# Patient Record
Sex: MALE | Race: WHITE
[De-identification: names, ages, dates, MRNs, and addresses within clinical notes are randomized per-mention and may not be internally consistent; named-entity substitution may affect disease eponyms.]

---

## 2017-04-26 ENCOUNTER — HOSPITAL ENCOUNTER (OUTPATIENT)
Dept: HOSPITAL 80 - FIMAGING | Age: 82
End: 2017-04-26
Attending: INTERNAL MEDICINE
Payer: COMMERCIAL

## 2017-04-26 DIAGNOSIS — I71.4: Primary | ICD-10-CM

## 2017-04-26 DIAGNOSIS — Z72.0: ICD-10-CM

## 2017-04-26 DIAGNOSIS — I10: ICD-10-CM

## 2017-11-07 ENCOUNTER — HOSPITAL ENCOUNTER (OUTPATIENT)
Dept: HOSPITAL 80 - FIMAGING | Age: 82
End: 2017-11-07
Attending: INTERNAL MEDICINE
Payer: COMMERCIAL

## 2017-11-07 DIAGNOSIS — D47.2: Primary | ICD-10-CM

## 2017-11-07 DIAGNOSIS — S22.050A: ICD-10-CM

## 2017-12-06 ENCOUNTER — HOSPITAL ENCOUNTER (OUTPATIENT)
Dept: HOSPITAL 80 - BMCIMAGING | Age: 82
End: 2017-12-06
Attending: INTERNAL MEDICINE
Payer: COMMERCIAL

## 2017-12-06 DIAGNOSIS — I70.0: Primary | ICD-10-CM

## 2017-12-28 ENCOUNTER — HOSPITAL ENCOUNTER (EMERGENCY)
Dept: HOSPITAL 80 - FED | Age: 82
Discharge: HOME | End: 2017-12-28
Payer: COMMERCIAL

## 2017-12-28 VITALS — RESPIRATION RATE: 18 BRPM | OXYGEN SATURATION: 99 %

## 2017-12-28 VITALS — HEART RATE: 67 BPM | DIASTOLIC BLOOD PRESSURE: 64 MMHG | SYSTOLIC BLOOD PRESSURE: 141 MMHG | TEMPERATURE: 97.3 F

## 2017-12-28 VITALS
RESPIRATION RATE: 16 BRPM | HEART RATE: 68 BPM | SYSTOLIC BLOOD PRESSURE: 110 MMHG | OXYGEN SATURATION: 99 % | DIASTOLIC BLOOD PRESSURE: 49 MMHG

## 2017-12-28 DIAGNOSIS — R04.0: Primary | ICD-10-CM

## 2017-12-28 DIAGNOSIS — Z79.82: ICD-10-CM

## 2017-12-28 DIAGNOSIS — F17.200: ICD-10-CM

## 2017-12-28 DIAGNOSIS — Z95.5: ICD-10-CM

## 2017-12-28 PROCEDURE — 2Y41X5Z PACKING OF NASAL REGION USING PACKING MATERIAL: ICD-10-PCS | Performed by: EMERGENCY MEDICINE

## 2017-12-28 NOTE — EDPHY
H & P


Stated Complaint: Here this morning w/nosebleed;returns because it still hasn't 

stopped


Time Seen by Provider: 12/28/17 13:13


HPI/ROS: 





CHIEF COMPLAINT:  Epistaxis





HISTORY OF PRESENT ILLNESS:  Patient is an 82-year-old man with history of AFib 

on Pradaxa who I saw her earlier this morning with epistaxis.  His bleeding 

stopped with Afrin and a cotton ball soaked in cocaine.  He states that it is 

starting to ooze again around the cotton ball.  He does not think that any is 

running down his throat. He has not had a fever.  He has not felt lightheaded 

or dizzy.








REVIEW OF SYSTEMS:


Constitutional:  denies: chills, fever, recent illness, recent injury


EENTM:  See HPI


Respiratory: denies: cough, shortness of breath


Cardiac: denies: chest pain, irregular heart rate, lightheadedness, palpitations


Gastrointestinal/Abdominal: denies: abdominal pain, diarrhea, nausea, vomiting, 

blood streaked stools


Genitourinary: denies: dysuria, frequency, hematuria, pain


Musculoskeletal: denies: joint pain, muscle pain


Skin: denies: lesions, rash, jaundice, bruising


Neurological: denies: headache, numbness, paresthesia, tingling, dizziness, 

weakness


Hematologic/Lymphatic: denies: blood clots, easy bleeding, easy bruising


Immunologic/allergic: denies: HIV/AIDS, transplant








EXAM:


GENERAL:  Well-appearing, well-nourished and in no acute distress.


HEAD:  Atraumatic, normocephalic.


EYES:  Pupils equal round and reactive to light, extraocular movements intact, 

sclera anicteric, conjunctiva are normal.


ENT:  Right nares with clot behind cotton ball.  It easily removed.  Anterior 

bruising.


NECK:  Normal range of motion, supple without lymphadenopathy or JVD.


LUNGS:  Breath sounds clear to auscultation bilaterally and equal.  No wheezes 

rales or rhonchi.


HEART:  Regular rate and rhythm without murmurs, rubs or gallops.


ABDOMEN:  Soft, nontender, normoactive bowel sounds.  No guarding, no rebound.  

No masses appreciated.


BACK:  No CVA tenderness, no spinal tenderness, step-offs or deformities


EXTREMITIES:  Normal range of motion, no pitting or edema.  No clubbing or 

cyanosis.


NEUROLOGICAL:  Cranial nerves II through XII grossly intact.  Normal speech, 

normal gait.  5/5 strength, normal movement in all extremities, normal sensation


PSYCH:  Normal mood, normal affect.


SKIN:  Warm, dry, normal turgor, no visible rashes or lesions.








Source: Patient


Exam Limitations: No limitations





- Personal History


Current Tetanus Diphtheria and Acellular Pertussis (TDAP): Yes


Tetanus Vaccine Date: 2003





- Medical/Surgical History


Hx Asthma: No


Hx Chronic Respiratory Disease: No


Hx Diabetes: No


Hx Cardiac Disease: Yes


Hx Renal Disease: No


Hx Cirrhosis: No


Hx Alcoholism: No


Hx HIV/AIDS: No


Hx Splenectomy or Spleen Trauma: No


Other PMH: AFib, Stents x 5.





- Family History


Significant Family History: No pertinent family hx





- Social History


Smoking Status: Current every day smoker


Alcohol Use: Sober


Drug Use: None


Constitutional: 


 Initial Vital Signs











Heart Rate  66   12/28/17 12:57


 


Respiratory Rate  18   12/28/17 12:57


 


Blood Pressure  137/68 H  12/28/17 12:57


 


O2 Sat (%)  99   12/28/17 12:57








 











O2 Delivery Mode               Room Air














Allergies/Adverse Reactions: 


 





clopidogrel bisulfate [From Plavix] Allergy (Severe, Verified 12/28/17 12:56)


 MUSCLE WEAKNESS


atorvastatin [Atorvastatin] Allergy (Verified 12/28/17 12:56)


 MUSCLE PAIN








Home Medications: 














 Medication  Instructions  Recorded


 


Aspirin [Aspirin 325 mg] 325 mg PO DAILY 01/17/12


 


Metoprolol Tartrate [Lopressor] 100 mg PO BID 01/17/12


 


Rosuvastatin Calcium [Crestor] 5 mg PO DAILY 01/17/12


 


Finasteride  12/28/17


 


Losartan/Hctz 50/12.5  12/28/17


 


Pradaxa 150 MG (*)  12/28/17


 


Tamsulosin HCl  12/28/17














Medical Decision Making


Procedures: 





The patient was again instilled with Afrin bilateral clamp placed for 20 min.  

The bleeding practically stopped.  Cocaine was then given for anesthesia and a 

rhino rocket placed.  The patient tolerated this well.  The bleeding is 

completely controlled.


ED Course/Re-evaluation: 





Patient tolerated the procedure well. I did recommend he leave in place for 48 

hr and started antibiotics.  He is extremely hesitant to start antibiotics.  We 

agreed to leave the packing in place only for 24 hr and at that duration he 

will not need to start antibiotics.  He will take it out himself we discussed 

steps to take if starts bleeding including using Afrin and a clamp again and 

returning to the ER.


Differential Diagnosis: 





Partial list of the Differential diagnosis considered include but were not 

limited to;  medication reaction, trauma, infection, anterior epistaxis, 

posterior epistaxis and although unlikely based on the history and physical exam

, I also considered hemoptysis, hemorrhage.  I discussed these differential 

diagnoses and the plan with the patient as well as the usual and expected 

course.  The patient understands that the diagnosis is provisional and that in 

medicine we are not always correct and that further workup is often warranted.  

Usual and customary warnings were given.  All of the patient's questions were 

answered.  The patient was instructed to return to the emergency department 

should the symptoms at all worsen or return, otherwise to followup with the 

physician as we discussed.





Departure





- Departure


Disposition: Home, Routine, Self-Care


Clinical Impression: 


 Anterior epistaxis





Condition: Fair


Instructions:  Nosebleed (ED)


Referrals: 


Wali Dias MD [Primary Care Provider] - As per Instructions

## 2017-12-28 NOTE — EDPHY
H & P


Stated Complaint: nose bleed since 2am. On blood thinners


Time Seen by Provider: 12/28/17 07:49


HPI/ROS: 





CHIEF COMPLAINT:  Epistaxis





HISTORY OF PRESENT ILLNESS:  The patient is an 82-year-old man on Pradaxa for 

history of AFib and cardiac stents who comes to the emergency department 

complaining of epistaxis from his right nares that began while he was asleep 

last night.  He did not try any interventions at home.  He does not feel 

lightheaded or dizzy.  No trauma. No fever.  No recent infection.  No vomiting.








REVIEW OF SYSTEMS:


Constitutional:  denies: chills, fever, recent illness, recent injury


EENTM:  See HPI


Respiratory: denies: cough, shortness of breath


Cardiac: denies: chest pain, irregular heart rate, lightheadedness, palpitations


Gastrointestinal/Abdominal: denies: abdominal pain, diarrhea, nausea, vomiting, 

blood streaked stools


Genitourinary: denies: dysuria, frequency, hematuria, pain


Musculoskeletal: denies: joint pain, muscle pain


Skin: denies: lesions, rash, jaundice, bruising


Neurological: denies: headache, numbness, paresthesia, tingling, dizziness, 

weakness


Hematologic/Lymphatic: denies: blood clots, easy bleeding, easy bruising


Immunologic/allergic: denies: HIV/AIDS, transplant








EXAM:


GENERAL:  Well-appearing, well-nourished and in no acute distress.


HEAD:  Atraumatic, normocephalic.


EYES:  Pupils equal round and reactive to light, extraocular movements intact, 

sclera anicteric, conjunctiva are normal.


ENT:  Anterior epistaxis right nares, TMs normal, oropharynx clear without 

exudates.  Moist mucous membranes.


NECK:  Normal range of motion, supple without lymphadenopathy or JVD.


LUNGS:  Breath sounds clear to auscultation bilaterally and equal.  No wheezes 

rales or rhonchi.


HEART:  Regular rate and rhythm without murmurs, rubs or gallops.


ABDOMEN:  Soft, nontender, normoactive bowel sounds.  No guarding, no rebound.  

No masses appreciated.


BACK:  No CVA tenderness, no spinal tenderness, step-offs or deformities


EXTREMITIES:  Normal range of motion, no pitting or edema.  No clubbing or 

cyanosis.


NEUROLOGICAL:  Cranial nerves II through XII grossly intact.  Normal speech, 

normal gait.  5/5 strength, normal movement in all extremities, normal sensation


PSYCH:  Normal mood, normal affect.


SKIN:  Warm, dry, normal turgor, no visible rashes or lesions.








Source: Patient


Exam Limitations: No limitations





- Personal History


Current Tetanus Diphtheria and Acellular Pertussis (TDAP): Yes


Tetanus Vaccine Date: 2003





- Medical/Surgical History


Hx Asthma: No


Hx Chronic Respiratory Disease: No


Hx Diabetes: No


Hx Cardiac Disease: Yes


Hx Renal Disease: No


Hx Cirrhosis: No


Hx Alcoholism: No


Hx HIV/AIDS: No


Hx Splenectomy or Spleen Trauma: No


Other PMH: AFib, Stents x 5.





- Social History


Smoking Status: Heavy smoker


Alcohol Use: Sober


Drug Use: None


Constitutional: 


 Initial Vital Signs











Heart Rate  91   12/28/17 07:30


 


Respiratory Rate  18   12/28/17 07:30


 


Blood Pressure  114/92 H  12/28/17 07:30


 


O2 Sat (%)  95   12/28/17 07:30








 











O2 Delivery Mode               Room Air














Allergies/Adverse Reactions: 


 





clopidogrel bisulfate [From Plavix] Allergy (Severe, Verified 12/28/17 12:56)


 MUSCLE WEAKNESS


atorvastatin [Atorvastatin] Allergy (Verified 12/28/17 12:56)


 MUSCLE PAIN








Home Medications: 














 Medication  Instructions  Recorded


 


Aspirin [Aspirin 325 mg] 325 mg PO DAILY 01/17/12


 


Metoprolol Tartrate [Lopressor] 100 mg PO BID 01/17/12


 


Rosuvastatin Calcium [Crestor] 5 mg PO DAILY 01/17/12


 


Finasteride  12/28/17


 


Losartan/Hctz 50/12.5  12/28/17


 


Pradaxa 150 MG (*)  12/28/17


 


Tamsulosin HCl  12/28/17














Medical Decision Making


Procedures: 





The patient's nose was instilled with Afrin both sides and nasal clamp placed.  

It was then cleared with blowing and a cotton ball soaked in cocaine placed in 

the right anterior nares.  Bleeding controlled.


ED Course/Re-evaluation: 





9:15 a.m. the patient is feeling much better.  The bleeding has stopped with a 

cotton ball packing.  Will leave this in place for 24 hr.  He is ambulating 

without difficulty.  He is eager to leave.  We discussed return precautions.


Differential Diagnosis: 





Partial list of the Differential diagnosis considered include but were not 

limited to;  anterior nose bleed, steroid nosebleed, infection, trauma and 

although unlikely based on the history and physical exam, I also considered 

hemorrhage.  I discussed these differential diagnoses and the plan with the 

patient as well as the usual and expected course.  The patient understands that 

the diagnosis is provisional and that in medicine we are not always correct and 

that further workup is often warranted.  Usual and customary warnings were 

given.  All of the patient's questions were answered.  The patient was 

instructed to return to the emergency department should the symptoms at all 

worsen or return, otherwise to followup with the physician as we discussed.





- Data Points


Medications Given: 


 








Discontinued Medications





Cocaine HCl (Cocaine Hcl)  1 satish TP EDNOW ONE


   Stop: 12/28/17 07:58


   Last Admin: 12/28/17 07:58 Dose:  4 ml


Oxymetazoline HCl (Afrin Nasal Spray)  2 sprays EACHNARE EDNOW ONE


   Stop: 12/28/17 07:52


   Last Admin: 12/28/17 07:58 Dose:  2 spray


Silver Nitrate/Potassium Nitrate (Silver Nitrate Applicator)  1 each TP EDNOW 

ONE


   Stop: 12/28/17 07:58


   Last Admin: 12/28/17 07:59 Dose:  1 each








Departure





- Departure


Disposition: Home, Routine, Self-Care


Clinical Impression: 


 Anterior epistaxis





Condition: Fair


Instructions:  Nosebleed (ED)


Referrals: 


Wali Dias MD [Primary Care Provider] - As per Instructions

## 2018-06-12 ENCOUNTER — HOSPITAL ENCOUNTER (OUTPATIENT)
Dept: HOSPITAL 80 - FED | Age: 83
Setting detail: OBSERVATION
LOS: 1 days | Discharge: HOME | End: 2018-06-13
Attending: HOSPITALIST | Admitting: FAMILY MEDICINE
Payer: COMMERCIAL

## 2018-06-12 DIAGNOSIS — E03.9: ICD-10-CM

## 2018-06-12 DIAGNOSIS — N40.0: ICD-10-CM

## 2018-06-12 DIAGNOSIS — Z79.01: ICD-10-CM

## 2018-06-12 DIAGNOSIS — Z95.5: ICD-10-CM

## 2018-06-12 DIAGNOSIS — E78.5: ICD-10-CM

## 2018-06-12 DIAGNOSIS — E66.9: ICD-10-CM

## 2018-06-12 DIAGNOSIS — I25.10: ICD-10-CM

## 2018-06-12 DIAGNOSIS — F17.210: ICD-10-CM

## 2018-06-12 DIAGNOSIS — Z82.49: ICD-10-CM

## 2018-06-12 DIAGNOSIS — Z79.82: ICD-10-CM

## 2018-06-12 DIAGNOSIS — I48.91: ICD-10-CM

## 2018-06-12 DIAGNOSIS — Z66: ICD-10-CM

## 2018-06-12 DIAGNOSIS — I11.0: ICD-10-CM

## 2018-06-12 DIAGNOSIS — F10.20: ICD-10-CM

## 2018-06-12 DIAGNOSIS — I50.9: Primary | ICD-10-CM

## 2018-06-12 LAB
INR PPP: 1.57 (ref 0.83–1.16)
PROTHROMBIN TIME: 18.9 SEC (ref 12–15)

## 2018-06-12 PROCEDURE — 99285 EMERGENCY DEPT VISIT HI MDM: CPT

## 2018-06-12 PROCEDURE — 93005 ELECTROCARDIOGRAM TRACING: CPT

## 2018-06-12 PROCEDURE — 97161 PT EVAL LOW COMPLEX 20 MIN: CPT

## 2018-06-12 NOTE — ASMTCMCOM
CM Note

 

CM Note                       

Notes:

6/12/2018 Case Management Note



Discussed pt during rounds this morning.  Pt admitted for SOB with CHF exacerbation.  Pt recently 

completed a lengthy road trip to [place] with his wife Latrice [phone].



Pt lives independently with his wife in his own home.  [place] 6 [place] 

[place]  His primary MD is Dr. Dias.



Case Management d/c poc:  anticipating independent with follow up as directed.



Case Management available if needs change.

 

Date Signed:  06/12/2018 03:12 PM

Electronically Signed By:Sangeetha Romo RN

## 2018-06-12 NOTE — GPROG
[f rep st]



                                                                    PROGRESS NOTE





DATE OF SERVICE:  06/12/2018



SUBJECTIVE:  The patient feels better today.  He states he diuresed effectively overnight after recei
ving IV Lasix.  He denies orthopnea.  He is still slightly short of breath, but this is improved.  He
 has no chest pain.  He is afebrile.  He is eating well.



OBJECTIVE:  VITAL SIGNS:  Stable.  GENERAL:  Patient is awake, alert, oriented in no acute distress. 
 HEENT:  Head is atraumatic, normocephalic.  Pupils equal, round, and reactive to light.  Extraocular
 motions intact.  Oropharynx is clear.  Mucous membranes are moist.  NECK:  Supple.  He has 10-12 cm 
JVD.  HEART:  Regular rate and rhythm with 2/6 systolic ejection murmur.  LUNGS:  Reveal bibasilar cr
ackles with fairly good air exchange.  ABDOMEN:  Soft, nondistended, nontender.  Normoactive bowel so
unds.  EXTREMITIES:  He has trace to 1+ bilateral lower extremity edema.  Extremities, otherwise warm
, well perfused.  NEUROLOGIC:  Grossly nonfocal.



LABORATORY DATA:  INR is 1.57.  BNP 2200.



ASSESSMENT/PLAN:  The patient is an 83-year-old male patient who presented to the hospital with dyspn
ea, admitted for acute heart failure management.

1.  Acute heart failure.  His symptoms are improved after intravenous Lasix.  We will continue Lasix 
today orally and check an echocardiogram to evaluate his left ventricular function.

2.  Coronary artery disease, status post stent x15.  His EKG is nonischemic.  There is no evidence of
 acute coronary syndrome.  As above, an echo is pending.

3.  Atrial fibrillation.  The patient is rate controlled.  We will continue anticoagulation with Prad
axa.

4.  Benign prostatic hypertrophy.  Continue finasteride and Flomax.

5.  Valvular heart disease.

6.  Hypothyroidism.

7.  Tobacco abuse.

8.  Alcohol dependence.  He drinks up to 5 vodkas a day.  I offered to order him vodka while in the h
ospital, though he declines.  Will need to watch closely for signs of withdrawal, and prior to onset 
of withdrawal, initiate alcohol or CIWA protocol.



DISPOSITION:  Patient will require greater than 48 hours hospitalization for ongoing management of hi
s acute heart failure.





Job #:  [id number]

## 2018-06-13 VITALS — SYSTOLIC BLOOD PRESSURE: 94 MMHG | DIASTOLIC BLOOD PRESSURE: 48 MMHG

## 2018-06-13 RX ADMIN — METOPROLOL SUCCINATE SCH MG: 50 TABLET, EXTENDED RELEASE ORAL at 09:38

## 2018-06-13 RX ADMIN — FUROSEMIDE SCH MG: 20 TABLET ORAL at 09:38

## 2018-06-13 RX ADMIN — NICOTINE SCH: 14 PATCH TRANSDERMAL at 09:37

## 2018-06-13 RX ADMIN — DABIGATRAN ETEXILATE MESYLATE SCH MG: 150 CAPSULE ORAL at 08:39

## 2018-06-13 RX ADMIN — LEVOTHYROXINE SODIUM SCH: 50 TABLET ORAL at 06:49

## 2018-06-13 RX ADMIN — TAMSULOSIN HYDROCHLORIDE SCH MG: 0.4 CAPSULE ORAL at 09:38

## 2018-06-13 RX ADMIN — NICOTINE SCH: 14 PATCH TRANSDERMAL at 08:26

## 2018-06-13 RX ADMIN — METOPROLOL SUCCINATE SCH: 50 TABLET, EXTENDED RELEASE ORAL at 08:26

## 2018-06-13 RX ADMIN — FINASTERIDE SCH: 5 TABLET, FILM COATED ORAL at 08:26

## 2018-06-13 RX ADMIN — TAMSULOSIN HYDROCHLORIDE SCH: 0.4 CAPSULE ORAL at 08:26

## 2018-06-13 RX ADMIN — ASPIRIN SCH MG: 81 TABLET, DELAYED RELEASE ORAL at 09:37

## 2018-06-13 RX ADMIN — DABIGATRAN ETEXILATE MESYLATE SCH: 150 CAPSULE ORAL at 08:26

## 2018-06-13 RX ADMIN — FINASTERIDE SCH MG: 5 TABLET, FILM COATED ORAL at 09:37

## 2018-06-13 RX ADMIN — LEVOTHYROXINE SODIUM SCH: 50 TABLET ORAL at 08:25

## 2018-06-13 RX ADMIN — FUROSEMIDE SCH: 20 TABLET ORAL at 08:26

## 2018-06-13 RX ADMIN — ASPIRIN SCH: 81 TABLET, DELAYED RELEASE ORAL at 08:25

## 2018-06-13 NOTE — GHP
[f rep st]



                                                            HISTORY AND PHYSICAL





DATE OF ADMISSION:  2018



SOURCE:  Patient able to provide some of the history.  He and his wife are fair historians.  Specific
 details regarding patient's cardiac history are a little limited.  Currently, [employer] is down and I
 am unable to verify patient's past cardiac history.  Case was discussed with ED provider.



CHIEF COMPLAINT:  Shortness of breath and chest pressure.



HISTORY OF PRESENT ILLNESS:  This is a very pleasant 83-year-old gentleman with a past medical histor
y significant for coronary artery disease with history of stents, AFib, hypothyroidism, BPH, and valv
ular heart disease who presents to the emergency department early this morning with complaints of robbi
rtness of breath and chest pressure.  The patient does not wear home oxygen.  He reports that he has 
had an intermittent nonproductive cough.  No fevers, chills.  He has chronic lower extremity edema wh
ich appears to be close to chronic close to baseline, which is minimal currently.  The patient also r
eports slight orthopnea but no PND.  He has had recent extended travel while driving to [place] and [employer].  He denies any calf pain or swelling.  No recent sick contacts.



REVIEW OF SYSTEMS:  GENERAL:  Patient reports that he always feels cold but has not had any fevers or
 chills otherwise. SKIN:  No rashes or sores.  ENT:  Patient with chronic nasal congestion but no sor
e throat. EYES:  No acute changes in vision or ocular pain.  CV:  Patient reports a central chest pre
ssure associated with his shortness of breath.  He also reports history of atrial fibrillation with i
ntermittent palpitations, but nothing currently.  RESPIRATORY:  See HPI.  GI:  No nausea, vomiting, a
bdominal pain or diarrhea.  :  No dysuria or hematuria.  Patient does have a history of nocturia an
d BPH.  MUSCULOSKELETAL:  Patient reports progressive upper extremity weakness that has been ongoing 
for the last 5 to 10 years.  He has no focal weakness complaint.  No joint pains from baseline.  NEUR
O:  Patient denies headache recently.  He has chronic neuropathy in both feet.  PSYCH:  Negative for 
anxiety, depression.  Remainder of review of systems negative except as noted above.



ALLERGIES:  No known drug allergies.



HOME MEDICATIONS:  List reviewed with the patient's wife.  Metoprolol succinate 100 mg half tab in th
e evening; rosuvastatin 20 mg half tab in the evening; levothyroxine 50 mcg daily; finasteride 5 mg, 
patient now taking a half tab at night; aspirin 81 mg in the morning; losartan 12.5 mg daily in the e
vening; Pradaxa 300 mg p.o. daily; tamsulosin 0.4 mg 2 tabs daily.



PAST MEDICAL HISTORY:  Significant for CAD with history of stenting.  The patient reports he has had 
a total of 5 stents placed very the course of 2 caths.  A history of atrial fibrillation on chronic a
nticoagulation with Pradaxa, hypothyroidism, BPH, valvular heart disease.  The patient is unsure if h
mae has any underlying CHF, but he does report approximately 20 to 30 years ago patient had a pericardi
al effusions.



PAST SURGICAL HISTORY:  Significant for cardiac cath x2 with 5 stents total, tonsillectomy, adenoidec
caro and left cataract extraction with lens placement.



FAMILY HISTORY:  Father , age 42, from [place].  CAD runs in the family.  Both his brothers had by
pass at younger age and a sister with history of diabetes type 2.



SOCIAL HISTORY:  Patient is , lives with his wife.  Son is in town and supportive.  He does co
ntinue to smoke approximately quarter pack to half pack per day.  Denies any illicit drug use or joana
olive use.  The patient does drink greater than 4 to 5 ounces, but he cannot exactly quantify his tot
al alcohol intake as he does utilize in his nonalcoholic beverages adding it several times daily.  De
christianoes any history of withdrawal.



CODE STATUS:  DNR/DNI with advanced directives in place.  Confirmed with wife at bedside.



PHYSICAL EXAM:  VITALS SIGNS:  Blood pressure initially 144/64, heart rate 77, respiratory rate 18, t
emperature 36.8, pulse ox 93% on room air.  Repeated blood pressure recently on exam is 110/63, heart
 rate 75, respiratory rate 16, pulse ox 96% on 2 L by nasal cannula.  GENERAL:  No acute distress.  V
beatris pleasant, frail, elderly gentleman is lying quietly in bed.  His wife is at bedside as well as hi
s son.  HEAD:  Normocephalic, atraumatic.  EYES:  Extraocular muscles are intact.  Pupils equal, roun
d, with decreased reactivity to light bilaterally and symmetric.  No scleral icterus or conjunctival 
injection.  ENT:  Mucous membranes appear moist.  No oropharyngeal erythema.  Dentures in place.  No 
nasal discharge. NECK:  Supple, trachea midline.  CV:  Quite distant heart sounds with fairly regular
 rhythm.  Patient has a holosystolic murmur, greatest at the left sternal border.  No chest wall tend
erness to palpation.  RESPIRATORY:  Diminished at the bases.  No wheezes or rhonchi appreciated.  Unl
abored breathing.  ABDOMEN:  Slightly distended.  Patient complains of bladder discomfort with palpat
ion over the lower abdomen, but no rebound, guarding, or masses are appreciated.  Positive bowel soun
ds.  :  No suprapubic tenderness to palpation.  Patient does report some bladder discomfort due to 
feeling like he needs to void.  No Nieves catheter in place.  EXTREMITIES:  Trace lower extremity gaby
a.  Patient with 1+ pedal pulses bilaterally and symmetric.  NEURO:  Grossly nonfocal, no facial droo
ping.  Generalized weakness.  Moves all extremities, upper and lower, while lying in bed.  PSYCH:  Th
ought process, content and questions are appropriate.  Patient is awake, alert, and oriented x3.  



EKG reviewed myself showing atrial fibrillation in the 70s with occasional PVC.  Patient with Q-waves
 in the inferior and anteroseptal leads.  QTc is 453.



LABORATORY STUDIES:  WBC 6.56, H and H 17.2 and 50.5, MCV 83.9, platelet count 143.  Patient with a n
eutrophilia of 92.7%, 0.8% bands.  PT 18.9, INR 1.57, PTT 47.6,  D-dimer 0.27.  Sodium is 134, potass
ium 4.4, chloride 101, CO2 22, BUN 12, creatinine 0.9, glucose 110, calcium 8.7. 



Chest x-ray:  Image reviewed myself.  Report is still pending.  Reviewed and discussed with ED provid
er showing cardiomegaly with some pulmonary vascular congestion.  No effusions appreciated.  No conso
lidations.



ASSESSMENT AND PLAN:  This is a very pleasant 83-year-old gentleman with a history of coronary artery
 disease, atrial fibrillation on anticoagulation and BPH who presents to the emergency department wit
h progressive shortness of breath and some central chest pressure.

1.  Dyspnea.  Differential diagnosis including chronic obstructive pulmonary disease exacerbation sagar
chacha congestive heart failure decompensation.  Patient reports having a recent echocardiogram complete
d on outpatient basis.  This was done with [facility] and we will try to obtain patient's recent records, bu
t may need to consider repeating echocardiogram during stay.  Patient received nebulizer treatment as
 well as a dose of Lasix after which he has been diuresing appropriately.  He reports his shortness o
f breath has improved.  We will continue with nebulizers, but hold off on steroid use and will hold o
ff on additional dosing of Lasix as patient has had multiple voids and continues to urinate well.  He
 does have a history of BPH and he is concerned regarding any obstruction.

2.  Chest pressure, possibly related to chronic obstructive pulmonary disease and hypoxia versus apurva
nary artery disease and angina.  The patient's troponin is negative.  His D-dimer was also found to b
e negative in setting of recent travel.  The patient without any calf pain or increased swelling.  We
 will hold off on additional evaluation given the specificity of the D-dimer.  The patient's BTMP was
 elevated at 2200.

3.  Chronic medical conditions:

a.  Atrial fibrillation, currently rate controlled.  Continue with anticoagulation.  

b.  Hypothyroidism.  Resume patient's levothyroxine replacement. 

c.  BPH.  Continue patient's finasteride and tamsulosin.  Family plans to bring in his home medicatio
ns as he will initially be under observation status.  

d.  History of valvular heart disease.  Monitor the patient's condition closely with ongoing diuresis
.

4.  Fluid, electrolytes, nutrition:  Saline lock IV.  Electrolyte monitoring and replacement if neede
d.  Cardiac diet has been ordered.

5.  Fluid restriction in addition to 1500 mL per day at this time.  Salt restriction as well.

6.  Code status is DNR/DNI.



DISPOSITION:  Patient admitted to observation status initially on PCU pending his clinical progressio
n with diuresis and nebulizer treatments for his dyspnea.  We will also plan to monitor troponin and 
trend given patient's complaint of chest pressure.  Of note, he and his wife do note that this pain d
oes feel quite different from his previous experiences during his MIs of left shoulder pain with dysp
efrain.





Job #:  [id number]

## 2018-06-13 NOTE — CPEKG
Heart Rate: 77

RR Interval: 779

QRSD Interval: 82

QT Interval: 400

QTC Interval: 453

QRS Axis: -24

T Wave Axis: 29

EKG Severity - ABNORMAL ECG -

EKG Impression: ATRIAL FIBRILLATION

EKG Impression: VENTRICULAR PREMATURE COMPLEX

EKG Impression: PROBABLE INFERIOR INFARCT, AGE INDETERMINATE

EKG Impression: CONSIDER ANTEROSEPTAL INFARCT

EKG Impression: ATRIAL FIBRILLATION HAS REPLACED NORMAL SINUS RHYTHM NOTED ON PRIOR ECG (FROM

EKG Impression: 25-JUL-2011)

Electronically Signed By: Flakito Urrutia 13-Jun-2018 11:30:00

## 2018-06-13 NOTE — EDPHY
[facility]  

  

Patient Name:  LETICIA MONTANEZ Rpt#:  [id number]  

Account Number:  [id number] Unit Number:  [id number]  

ER Physician:  Nabor Ayers MD Patient Type:  ADM Cheryl  

Adm Date/Source:  06/12/18 EMR Discharge Date:    

Primary Carrier:  MEDICARE OUTPATIENT  

___________________________________________________________________________________  

                  EMERGENCY DEPARTMENT PROVIDER REPORT  

  

  

H   P  

Stated Complaint: chest pain  

Time Seen by Provider: 06/12/18 01:50  

HPI/ROS:   

  

HPI  

  

CHIEF COMPLAINT:  Chest pain, shortness of breath  

HISTORY OF PRESENT ILLNESS:  Patient is a very pleasant 83-year-old male, presents emergency room 

with chest discomfort and shortness of breath.  He does report to me that he has coronary artery 

disease with 5 stents.  Additionally he takes Pradaxa as he history of AFib.  He does have a long 

history of smoking tobacco and smokes half a pack per day.  He recently went on a trip to [place] 

drove there and back.  For the past 2 days he has gotten progressively worsening shortness of 

breath.  Also some chest pressure.  Decided come the emergency room as he was unable to sleep.  He 

states he can't take a deep breath in.  

  

Past Medical History:  Coronary artery disease with multiple stents, AFib, hypertension, 

hyperlipidemia  

  

Past Surgical History cardiac stents  

  

Social History:  Denies drugs alcohol tobacco.  

  

Family History:  Noncontributory  

  

  

ROS     

REVIEW OF SYSTEMS:  

A comprehensive 10 point review of systems is otherwise negative aside from elements mentioned in 

the history of present illness.  

  

  

Exam     

Constitutional   triage nursing summary reviewed, vital signs reviewed, awake/alert.   

Eyes   normal conjunctivae and sclera, EOMI, PERRLA.   

HENT   normal inspection, atraumatic, moist mucus membranes, no epistaxis, neck supple/ no 

meningismus, no raccoon eyes.   

Respiratory   decreased breath sounds bilaterally, no respiratory distress, no wheezing.   

Cardiovascular   rate normal, regular rhythm, no murmur, no edema, distal pulses normal.   

Gastrointestinal   soft, non-tender, no rebound, no guarding, normal bowel sounds, no distension, 

no pulsatile mass.   

Genitourinary   no CVA tenderness.   

Musculoskeletal  no midline vertebral tenderness, full range of motion, no calf swelling, no 

tenderness of extremities, no meningismus, good pulses, neurovascularly intact.  

Skin   pink, warm,   dry, no rash, skin atraumatic.   

Neurologic   awake, alert and oriented x 3, AAOx3, moves all 4 extremities equally, motor intact, 

sensory intact, CN II-XII intact, normal cerebellar, normal vision, normal speech.   

Psychiatric   normal mood/affect.   

Heme/Lymph/Immune   no lymphadenopathy.  

  

Differential diagnosis includes but is not limited to:  ACS, atypical chest pain, pneumothorax, 

pneumonia, pulmonary embolism, aortic dissection, congestive heart failure, tumor, musculoskeletal 

pain, esophageal pain, GERD, peptic ulcer disease, pancreatitis  

  

Medical Decision Making:  Plan for this patient IV establishment full cardiac monitor obtain EKG to 

rule out acute coronary syndrome, chest x-ray, check D-dimer, check a BNP, and re-evaluate.  

  

Re-evaluation:  

EKG interpretation by me on record in [employer] system.  Impression time of EKG 2:03 a.m., this 

is AFib rate of 77 PVC present.  No acute ischemic change.  

Chest x-ray reviewed by myself.  Two view.  Cardiomegaly with pulmonary edema.  

  

0330:  I have ordered this patient 40 mg IV Lasix.  Is also noted his BNP is elevated at 2290  

  

Patient's troponin noted to be negative.  0.02.  Additionally patient's D-dimer-0.27.  

  

Patient has elevated BNP, shortness of breath, decreased breath sounds bilaterally, cardiomegaly on 

the chest x-ray with some pulmonary edema  

  

I have ordered this patient IV Lasix 40 mg. EKG shows AFib with no acute ischemia.  

  

Given this patient's cardiac risk factors including obesity, hyperlipidemia, hypertension, coronary 

disease with stents patient need to be admitted the hospital.  

  

Final diagnosis shortness of breath, congestive heart failure.  

  

Spoke with the hospitalist service 400AM; Dr. Myers agrees to admit.   

  

Source: Patient  

  

- Personal History  

Current Tetanus/Diphtheria Vaccine: Yes  

Current Tetanus Diphtheria and Acellular Pertussis (TDAP): Yes  

Tetanus Vaccine Date: 2003  

  

- Medical/Surgical History  

Hx Asthma: No  

Hx Chronic Respiratory Disease: No  

Hx Diabetes: No  

Hx Cardiac Disease: Yes  

Hx Renal Disease: No  

Hx Cirrhosis: No  

Hx Alcoholism: No  

Hx HIV/AIDS: No  

Hx Splenectomy or Spleen Trauma: No  

Other PMH: AFib, Stents x 5.  

  

- Social History  

Smoking Status: Current every day smoker  

Constitutional:   

  

 Initial Vital Signs  

  

  

  

Temperature (C)  36.8 C   06/12/18 01:50  

   

Heart Rate  77   06/12/18 01:50  

   

Respiratory Rate  18   06/12/18 01:50  

   

Blood Pressure  114/64   06/12/18 01:50  

   

O2 Sat (%)  93   06/12/18 01:50  

  

  

   

  

  

  

O2 Delivery Mode               Room Air  

  

  

  

  

Allergies/Adverse Reactions:   

   

  

clopidogrel bisulfate [From Plavix] Allergy (Severe, Verified 12/28/17 12:56)  

 MUSCLE WEAKNESS  

atorvastatin [Atorvastatin] Allergy (Verified 12/28/17 12:56)  

 MUSCLE PAIN  

  

  

Home Medications:   

  

  

  

  

 Medication  Instructions  Recorded  

   

Aspirin [Aspirin 325 mg] 325 mg PO DAILY 01/17/12  

   

Metoprolol Tartrate [Lopressor] 100 mg PO BID 01/17/12  

   

Rosuvastatin Calcium [Crestor] 5 mg PO DAILY 01/17/12  

   

Finasteride  12/28/17  

   

Losartan/Hctz 50/12.5  12/28/17  

   

Pradaxa 150 MG (*)  12/28/17  

   

Tamsulosin HCl  12/28/17  

  

  

  

  

Medical Decision Making  

  

- Data Points  

Laboratory Results:   

  

 Laboratory Results  

  

 06/12/18 02:11   

  

 06/12/18 02:11   

  

   

  

  

  

  06/12/18 06/12/18 06/12/18  

  

  02:11 02:11 02:11  

   

WBC      5.20 10 3/uL 10 3/uL  

  

     (3.80-9.50)   

   

RBC      3.20 10 6/uL L 10 6/uL  

  

     (4.40-6.38)   

   

Hgb      10.9 g/dL L g/dL  

  

     (13.7-17.5)   

   

Hct      31.6 % L %  

  

     (40.0-51.0)   

   

MCV      98.8 fL fL  

  

     (81.5-99.8)   

   

MCH      34.1 pg pg  

  

     (27.9-34.1)   

   

MCHC      34.5 g/dL g/dL  

  

     (32.4-36.7)   

   

RDW      15.0 % %  

  

     (11.5-15.2)   

   

Plt Count      127 10 3/uL L 10 3/uL  

  

     (150-400)   

   

MPV      11.2 fL fL  

  

     (8.7-11.7)   

   

Neut % (Auto)      53.8 % %  

  

     (39.3-74.2)   

   

Lymph % (Auto)      29.0 % %  

  

     (15.0-45.0)   

   

Mono % (Auto)      12.5 % %  

  

     (4.5-13.0)   

   

Eos % (Auto)      3.5 % %  

  

     (0.6-7.6)   

   

Baso % (Auto)      0.4 % %  

  

     (0.3-1.7)   

   

Nucleat RBC Rel Count      0.0 % %  

  

     (0.0-0.2)   

   

Absolute Neuts (auto)      2.80 10 3/uL 10 3/uL  

  

     (1.70-6.50)   

   

Absolute Lymphs (auto)      1.51 10 3/uL 10 3/uL  

  

     (1.00-3.00)   

   

Absolute Monos (auto)      0.65 10 3/uL 10 3/uL  

  

     (0.30-0.80)   

   

Absolute Eos (auto)      0.18 10 3/uL 10 3/uL  

  

     (0.03-0.40)   

   

Absolute Basos (auto)      0.02 10 3/uL 10 3/uL  

  

     (0.02-0.10)   

   

Absolute Nucleated RBC      0.00 10 3/uL 10 3/uL  

  

     (0-0.01)   

   

Immature Gran %      0.8 % %  

  

     (0.0-1.1)   

   

Immature Gran #      0.04 10 3/uL 10 3/uL  

  

     (0.00-0.10)   

   

PT    18.9 SEC H SEC    

  

    (12.0-15.0)    

   

INR    1.57  H     

  

    (0.83-1.16)    

   

APTT    47.6 SEC H SEC    

  

    (23.0-38.0)    

   

D-Dimer    < 0.27 ug/mLFEU ug/mLFEU    

  

    (0.00-0.50)    

   

Sodium  134 mEq/L L mEq/L      

  

   (135-145)     

   

Potassium  4.4 mEq/L mEq/L      

  

   (3.3-5.0)     

   

Chloride  101 mEq/L mEq/L      

  

   ()     

   

Carbon Dioxide  22 mEq/l mEq/l      

  

   (22-31)     

   

Anion Gap  11 mEq/L mEq/L      

  

   (8-16)     

   

BUN  12 mg/dL mg/dL      

  

   (7-23)     

   

Creatinine  0.9 mg/dL mg/dL      

  

   (0.7-1.3)     

   

Estimated GFR  > 60       

  

      

   

Glucose  110 mg/dL H mg/dL      

  

   ()     

   

Calcium  8.7 mg/dL mg/dL      

  

   (8.5-10.4)     

   

NT-Pro-B Natriuret Pep  2290 pg/mL H pg/mL      

  

   (0-450)     

  

  

  

  

Departure  

  

- Departure  

Disposition: [facility] Inpatient Acute  

Clinical Impression:   

 Acute dyspnea  

  

Chest pain  

Qualifiers:  

 Chest pain type: unspecified Qualified Code(s): R07.9 - Chest pain, unspecified  

  

Condition: Fair  

  

  

*This report may have been compiled using a voice recognition system, and might contain 

typographical errors and blanks.*  

  

Nabor Ayers MD  

  

  

  

  

  

  

  

06/12/18 0753 <Electronically signed by Nabor Ayers MD>  

   

   

   

  

D: 06/12/18 0752  T: AARON  06/12/18 0752  

CC: Wali Dias MD

## 2018-06-13 NOTE — EDPHY
H & P


Stated Complaint: recent air travel, today having cp/sob/intermittent dizziness


Time Seen by Provider: 06/12/18 03:00


HPI/ROS: 





HPI





CHIEF COMPLAINT:  Chest pain, shortness of breath


HISTORY OF PRESENT ILLNESS:  Patient is a very pleasant 83-year-old male, 

presents emergency room with chest discomfort and shortness of breath.  He does 

report to me that he has coronary artery disease with 5 stents.  Additionally 

he takes Pradaxa as he history of AFib.  He does have a long history of smoking 

tobacco and smokes half a pack per day.  He recently went on a trip to [place] 

drove there and back.  For the past 2 days he has gotten progressively 

worsening shortness of breath.  Also some chest pressure.  Decided come the 

emergency room as he was unable to sleep.  He states he can't take a deep 

breath in.





Past Medical History:  Coronary artery disease with multiple stents, AFib, 

hypertension, hyperlipidemia





Past Surgical History cardiac stents





Social History:  Denies drugs alcohol tobacco.





Family History:  Noncontributory








ROS   


REVIEW OF SYSTEMS:


A comprehensive 10 point review of systems is otherwise negative aside from 

elements mentioned in the history of present illness.








Exam   


Constitutional   triage nursing summary reviewed, vital signs reviewed, awake/

alert. 


Eyes   normal conjunctivae and sclera, EOMI, PERRLA. 


HENT   normal inspection, atraumatic, moist mucus membranes, no epistaxis, neck 

supple/ no meningismus, no raccoon eyes. 


Respiratory   decreased breath sounds bilaterally, no respiratory distress, no 

wheezing. 


Cardiovascular   rate normal, regular rhythm, no murmur, no edema, distal 

pulses normal. 


Gastrointestinal   soft, non-tender, no rebound, no guarding, normal bowel 

sounds, no distension, no pulsatile mass. 


Genitourinary   no CVA tenderness. 


Musculoskeletal  no midline vertebral tenderness, full range of motion, no calf 

swelling, no tenderness of extremities, no meningismus, good pulses, 

neurovascularly intact.


Skin   pink, warm, & dry, no rash, skin atraumatic. 


Neurologic   awake, alert and oriented x 3, AAOx3, moves all 4 extremities 

equally, motor intact, sensory intact, CN II-XII intact, normal cerebellar, 

normal vision, normal speech. 


Psychiatric   normal mood/affect. 


Heme/Lymph/Immune   no lymphadenopathy.





Differential diagnosis includes but is not limited to:  ACS, atypical chest pain

, pneumothorax, pneumonia, pulmonary embolism, aortic dissection, congestive 

heart failure, tumor, musculoskeletal pain, esophageal pain, GERD, peptic ulcer 

disease, pancreatitis





Medical Decision Making:  Plan for this patient IV establishment full cardiac 

monitor obtain EKG to rule out acute coronary syndrome, chest x-ray, check D-

dimer, check a BNP, and re-evaluate.





Re-evaluation:


EKG interpretation by me on record in [employer] system.  Impression time of 

EKG 2:03 a.m., this is AFib rate of 77 PVC present.  No acute ischemic change.


Chest x-ray reviewed by myself.  Two view.  Cardiomegaly with pulmonary edema.





0330:  I have ordered this patient 40 mg IV Lasix.  Is also noted his BNP is 

elevated at 2290





Patient's troponin noted to be negative.  0.02.  Additionally patient's D-dimer-

0.27.





Patient has elevated BNP, shortness of breath, decreased breath sounds 

bilaterally, cardiomegaly on the chest x-ray with some pulmonary edema





I have ordered this patient IV Lasix 40 mg. EKG shows AFib with no acute 

ischemia.





Given this patient's cardiac risk factors including obesity, hyperlipidemia, 

hypertension, coronary disease with stents patient need to be admitted the 

hospital.





Final diagnosis shortness of breath, congestive heart failure.





Spoke with the hospitalist service 400AM; Dr. Myers agrees to admit. 





Source: Patient





- Personal History


Tetanus Vaccine Date: 2003





- Medical/Surgical History


Hx Asthma: No


Hx Chronic Respiratory Disease: No


Hx Diabetes: No


Hx Cardiac Disease: Yes


Hx Renal Disease: No


Hx Cirrhosis: No


Hx Alcoholism: No


Hx HIV/AIDS: No


Hx Splenectomy or Spleen Trauma: No


Other PMH: AFib, Stents x 5, pericarditis, mi, bph, hyperlipidemia, hypertension

, hypothyroid





- Social History


Smoking Status: Current every day smoker


Constitutional: 





 Initial Vital Signs











Temperature (C)  36.8 C   06/12/18 01:52


 


Heart Rate  77   06/12/18 01:52


 


Respiratory Rate  18   06/12/18 01:52


 


Blood Pressure  114/64   06/12/18 01:52


 


O2 Sat (%)  93   06/12/18 01:52








 











O2 Delivery Mode               Room Air














Allergies/Adverse Reactions: 


 





clopidogrel bisulfate [From Plavix] Allergy (Severe, Verified 12/28/17 12:56)


 MUSCLE WEAKNESS


atorvastatin [Atorvastatin] Allergy (Verified 12/28/17 12:56)


 MUSCLE PAIN








Home Medications: 














 Medication  Instructions  Recorded


 


Aspirin [Aspirin 325 mg] 325 mg PO DAILY 01/17/12


 


Metoprolol Tartrate [Lopressor] 100 mg PO BID 01/17/12


 


Rosuvastatin Calcium [Crestor] 5 mg PO DAILY 01/17/12


 


Finasteride  12/28/17


 


Losartan/Hctz 50/12.5  12/28/17


 


Pradaxa 150 MG (*)  12/28/17


 


Tamsulosin HCl  12/28/17














Medical Decision Making





- Data Points


Laboratory Results: 





 Laboratory Results





 06/12/18 02:08 





 











  06/12/18 06/12/18





  02:11 02:08


 


PT  18.9 SEC H SEC  





   (12.0-15.0)  


 


INR  1.57  H   





   (0.83-1.16)  


 


APTT  47.6 SEC H SEC  





   (23.0-38.0)  


 


D-Dimer  < 0.27 ug/mLFEU ug/mLFEU  





   (0.00-0.50)  


 


Sodium    134 mEq/L L mEq/L





    (135-145) 


 


Potassium    4.4 mEq/L mEq/L





    (3.3-5.0) 


 


Chloride    101 mEq/L mEq/L





    () 


 


Carbon Dioxide    22 mEq/l mEq/l





    (22-31) 


 


Anion Gap    11 mEq/L mEq/L





    (8-16) 


 


BUN    12 mg/dL mg/dL





    (7-23) 


 


Creatinine    0.9 mg/dL mg/dL





    (0.7-1.3) 


 


Estimated GFR    > 60 





   


 


Glucose    110 mg/dL H mg/dL





    () 


 


Calcium    8.7 mg/dL mg/dL





    (8.5-10.4) 


 


NT-Pro-B Natriuret Pep    2290 pg/mL H pg/mL





    (0-450) 














Departure





- Departure


Disposition: [facility] Inpatient Acute


Clinical Impression: 


Chest pain


Qualifiers:


 Chest pain type: unspecified Qualified Code(s): R07.9 - Chest pain, unspecified





Condition: Fair

## 2018-06-14 NOTE — PDMN
Medical Necessity


Medical necessity: est los>2mn for management of heart failure with transition 

to oral Lasix; comorbid CAD, afib , valvular heart disease, and etoh dependence

;  per order and progress note 6/12/18

## 2018-06-19 ENCOUNTER — HOSPITAL ENCOUNTER (INPATIENT)
Dept: HOSPITAL 80 - FED | Age: 83
LOS: 4 days | Discharge: HOME | DRG: 378 | End: 2018-06-23
Attending: INTERNAL MEDICINE | Admitting: INTERNAL MEDICINE
Payer: COMMERCIAL

## 2018-06-19 DIAGNOSIS — Z95.5: ICD-10-CM

## 2018-06-19 DIAGNOSIS — I95.9: ICD-10-CM

## 2018-06-19 DIAGNOSIS — J44.9: ICD-10-CM

## 2018-06-19 DIAGNOSIS — I11.0: ICD-10-CM

## 2018-06-19 DIAGNOSIS — F17.210: ICD-10-CM

## 2018-06-19 DIAGNOSIS — I48.2: ICD-10-CM

## 2018-06-19 DIAGNOSIS — E78.5: ICD-10-CM

## 2018-06-19 DIAGNOSIS — N40.0: ICD-10-CM

## 2018-06-19 DIAGNOSIS — I25.10: ICD-10-CM

## 2018-06-19 DIAGNOSIS — K25.4: Primary | ICD-10-CM

## 2018-06-19 DIAGNOSIS — I50.9: ICD-10-CM

## 2018-06-19 DIAGNOSIS — D62: ICD-10-CM

## 2018-06-19 DIAGNOSIS — Z79.01: ICD-10-CM

## 2018-06-19 DIAGNOSIS — E03.9: ICD-10-CM

## 2018-06-19 LAB
INR PPP: 1.89 (ref 0.83–1.16)
PLATELET # BLD: 179 10^3/UL (ref 150–400)
PROTHROMBIN TIME: 21.8 SEC (ref 12–15)

## 2018-06-19 PROCEDURE — P9021 RED BLOOD CELLS UNIT: HCPCS

## 2018-06-19 PROCEDURE — P9016 RBC LEUKOCYTES REDUCED: HCPCS

## 2018-06-19 RX ADMIN — ROSUVASTATIN CALCIUM SCH: 20 TABLET, FILM COATED ORAL at 20:28

## 2018-06-19 RX ADMIN — PANTOPRAZOLE SODIUM SCH MG: 40 INJECTION, POWDER, FOR SOLUTION INTRAVENOUS at 20:36

## 2018-06-19 RX ADMIN — THERA TABS SCH: TAB at 20:27

## 2018-06-19 NOTE — PDGENHP
History and Physical





- Chief Complaint


dizzyness





- History of Present Illness


82 yo M with PMH of CAD, a fib on chronic AC presenting with several days of 

lightheadedness/dizzyness in the setting of dark/tarry stools and more recently 

several episodes of vomiting dark/red vomit he suspected was blood due to the 

appearance and metallic taste. He notes that he initially assumed his stools 

were dark because he was taking iron. He became increasingly dizzy and weak to 

the point where he actually fell and felt confused. He has never had similar 

issues in the past. He denies abdominal pain. He has not had bright red blood 

in his stool. He denies chest pain or sob. He did not take his pradaxa today 

but he did take all of his BP medications. 





History Information





- Allergies/Home Medication List


Allergies/Adverse Reactions: 








clopidogrel bisulfate [From Plavix] Allergy (Severe, Verified 06/19/18 09:23)


 MUSCLE WEAKNESS


atorvastatin [Atorvastatin] Allergy (Verified 06/19/18 09:23)


 MUSCLE PAIN





Home Medications: 








Dabigatran Etexilate Mesyl [Pradaxa 150 MG (*)] 150 mg PO BID 12/28/17 [Last 

Taken 06/18/18 21:00]


Finasteride [Proscar 5 MG (*)] 2.5 mg PO DAILY 12/28/17 [Last Taken 06/19/18]


Losartan Potassium [Cozaar 25 mg (*)] 12.5 mg PO HS 12/28/17 [Last Taken 06/18/ 18]


Tamsulosin HCl [Flomax 0.4 MG (*)] 0.8 mg PO DAILY 12/28/17 [Last Taken 06/18/18

]


Aspirin [Aspirin 81mg (*)] 81 mg PO HS 06/13/18 [Last Taken 06/18/18]


Levothyroxine [Synthroid 75 mcg (*)] 75 mcg PO DAILY06 06/13/18 [Last Taken 06/ 19/18]


Metoprolol Succinate Xr [Toprol Xl 50 mg (*)] 50 mg PO BID 06/13/18 [Last Taken 

06/19/18]


Rosuvastatin Calcium [Crestor 20mg (*)] 10 mg PO HS 06/13/18 [Last Taken 06/18/ 18]


Herbals/Supplements -Info Only 1 ea PO DAILY 06/19/18 [Last Taken Unknown]


Multivitamins [Multivitamin (*)] 0.5 each PO BID 06/19/18 [Last Taken 06/18/18 

21:00]





I have personally reviewed and updated: family history, medical history, social 

history, surgical history





- Past Medical History


atrial fibrillation, coronary artery disease (s/p multiple stents), COPD, 

hypertension, hyperlipidemia


Additional medical history: hypothyroid.  BPH





- Surgical History


Reports: coronary stent (x 5)


Additional surgical history: tonsills





- Family History


Positive for: male first degree with history of premature CAD





- Social History


Smoking Status: Current every day smoker


Alcohol Use: Heavy (drinks 3-4 drinks/day, 6-8 oz heavy etoh)


Drug Use: None


Additional social history: , accompanied by his son and wife





Review of Systems


Review of Systems: 





ROS: 10pt was reviewed & negative except for what was stated in HPI & below





Physical Exam


Physical Exam: 

















Temp Pulse Resp BP Pulse Ox


 


 36.4 C   69   18   98/54 L  99 


 


 06/19/18 11:14  06/19/18 12:46  06/19/18 12:46  06/19/18 12:46  06/19/18 12:46




















O2 (L/minute)                  36.6














Constitutional: no apparent distress, appears nourished


Eyes: PERRL, pale conjunctiva


Ears, Nose, Mouth, Throat: moist mucous membranes, hearing normal


Cardiovascular: regular rate and rhythym, no murmur, rub, or gallop, No edema


Respiratory: no respiratory distress, no rales or rhonchi, clear to auscultation


Gastrointestinal: normoactive bowel sounds, soft, non-tender abdomen


Skin: warm, No normal color


Musculoskeletal: no muscle tenderness


Neurologic: AAOx3


Psychiatric: interacting appropriately, not anxious, not encephalopathic





Lab Data & Imaging Review





 06/19/18 17:00





 06/19/18 09:35














WBC  7.79 10^3/uL (3.80-9.50)   06/19/18  09:35    


 


RBC  1.92 10^6/uL (4.40-6.38)  L  06/19/18  09:35    


 


Hgb  6.5 g/dL (13.7-17.5)  L  06/19/18  09:35    


 


Hct  19.1 % (40.0-51.0)  L  06/19/18  09:35    


 


MCV  99.5 fL (81.5-99.8)   06/19/18  09:35    


 


MCH  33.9 pg (27.9-34.1)   06/19/18  09:35    


 


MCHC  34.0 g/dL (32.4-36.7)   06/19/18  09:35    


 


RDW  15.6 % (11.5-15.2)  H  06/19/18  09:35    


 


Plt Count  179 10^3/uL (150-400)   06/19/18  09:35    


 


MPV  11.0 fL (8.7-11.7)   06/19/18  09:35    


 


Neut % (Auto)  66.1 % (39.3-74.2)   06/19/18  09:35    


 


Lymph % (Auto)  20.4 % (15.0-45.0)   06/19/18  09:35    


 


Mono % (Auto)  9.8 % (4.5-13.0)   06/19/18  09:35    


 


Eos % (Auto)  2.8 % (0.6-7.6)   06/19/18  09:35    


 


Baso % (Auto)  0.3 % (0.3-1.7)   06/19/18  09:35    


 


Nucleat RBC Rel Count  0.0 % (0.0-0.2)   06/19/18  09:35    


 


Absolute Neuts (auto)  5.15 10^3/uL (1.70-6.50)   06/19/18  09:35    


 


Absolute Lymphs (auto)  1.59 10^3/uL (1.00-3.00)   06/19/18  09:35    


 


Absolute Monos (auto)  0.76 10^3/uL (0.30-0.80)   06/19/18  09:35    


 


Absolute Eos (auto)  0.22 10^3/uL (0.03-0.40)   06/19/18  09:35    


 


Absolute Basos (auto)  0.02 10^3/uL (0.02-0.10)   06/19/18  09:35    


 


Absolute Nucleated RBC  0.00 10^3/uL (0-0.01)   06/19/18  09:35    


 


Immature Gran %  0.6 % (0.0-1.1)   06/19/18  09:35    


 


Immature Gran #  0.05 10^3/uL (0.00-0.10)   06/19/18  09:35    


 


Platelet Estimate  ADEQUATE  (ADEQ)   06/19/18  09:35    


 


Oval Macrocytes  1+  H  06/19/18  09:35    


 


Elliptocytes  1+  H  06/19/18  09:35    


 


Acanthocytes (Spur)  1+  H  06/19/18  09:35    


 


PT  21.8 SEC (12.0-15.0)  H  06/19/18  09:35    


 


INR  1.89  (0.83-1.16)  H  06/19/18  09:35    


 


APTT  62.2 SEC (23.0-38.0)  H  06/19/18  09:35    


 


Sodium  134 mEq/L (135-145)  L  06/19/18  09:35    


 


Potassium  3.9 mEq/L (3.3-5.0)   06/19/18  09:35    


 


Chloride  101 mEq/L ()   06/19/18  09:35    


 


Carbon Dioxide  21 mEq/l (22-31)  L  06/19/18  09:35    


 


Anion Gap  12 mEq/L (8-16)   06/19/18  09:35    


 


BUN  54 mg/dL (7-23)  H  06/19/18  09:35    


 


Creatinine  0.9 mg/dL (0.7-1.3)   06/19/18  09:35    


 


Estimated GFR  > 60   06/19/18  09:35    


 


Glucose  105 mg/dL ()  H  06/19/18  09:35    


 


Calcium  8.3 mg/dL (8.5-10.4)  L  06/19/18  09:35    


 


POC Troponin I  0.00 ng/mL (0.00-0.08)   06/19/18  09:45    


 


NT-Pro-B Natriuret Pep  1410 pg/mL (0-450)  H  06/19/18  09:35    


 


Patient ABO/Rh  O POSITIVE   06/19/18  10:15    


 


Antibody Screen  NEGATIVE   06/19/18  10:15    


 


Crossmatch IS Only  See Detail   06/19/18  10:15    








Visualized and Interpreted imaging results: Yes


Interpretation: head ct : negative


Visualized and Interpreted EKG results: Yes


EKG additional interpertation: a fib normal rate





Assessment & Plan


Assessment: 








GI bleed (Acute)





82 yo M with hx of CAD, a fib on chronic AC presenting with GI bleed and severe 

anemia





# severe acute blood loss anemia: transfused 2 units with repeat h/h not 

improved, presumed 2/2 GI bleed as next. Will continue to transfuse prn hct < 

21. Discussed with GI who feel that given lack of ongoing obvious blood loss 

that this does not necessarily indicate active bleed. Will continue serial h/h 


# GI bleed: presumably upper bleed, continue pantoprazole IV. GI involved as 

above with plan to scope in am unless bleeding increases and will then perform 

tonight. Patient does have drinking hx however no hx of or stigmata of 

cirrhosis so doubt this represents variceal bleed. 


# a fib, persistent: noted on ecg, rate controlled, did have his metoprolol 

this am but given his hypotension holding metoprolol for now unless RVR 

develops. holding pradaxa given acute bleed. If h/h continues to decline will 

start praxbind. 


# hypotension: thus far has been relatively fluid responsive, continue to hold 

BP meds including losartan, lasix and metoprolol--consider starting low dose 

metop if hr increases as above 


# CAD: trop of 0, no chest pain, will get repeat trop given bleed, recent echo 

showing EF of 69%, no significant vhd


# bph: continue finasteride


# hypothyroid: continue lt4


# IP status, patient very high risk with extremely low h/h, concern for active 

bleeding, requires ICU care


Care plan reviewed with Dr. Luciano, further hx obtained from family present at 

bedside


>60 min critical care time spent in eval/mgmt of above

## 2018-06-19 NOTE — CPEKG
Heart Rate: 76

RR Interval: 789

QRSD Interval: 82

QT Interval: 420

QTC Interval: 473

QRS Axis: -19

EKG Severity - ABNORMAL ECG -

EKG Impression: ATRIAL FIBRILLATION, V-RATE 59-93

EKG Impression: PROBABLE INFERIOR INFARCT, AGE INDETERMINATE

Electronically Signed By: Shankar Dias 20-Jun-2018 16:35:21

## 2018-06-19 NOTE — ECHO
[email or web address]





[address] [address] 

Main: [phone] 



Fax: 



Transthoracic Echocardiogram 

Name:             LETICIA MONTANEZ                       MR#:

[id number]

Study Date:       2018                            Study Time:

01:44 PM

YOB: 1935                            Age:

83 year(s)

Height:           180.3 cm (71 in.)                     Weight:

79.38 kg (175 lb.)

BSA:              1.99 m2                               Gender:

Male

Examination:      Echo                                  Indication:

heart failure

Image Quality:    Technically Difficult                 Contrast: 

Requested by:     Cristina Myers                          BP:

97 mmHg/54 mmHg

Heart Rate:                                             Rhythm:

Atrial fibrillation

Indication:       heart failure 



Procedure Staff 

Ultrasound Technician:   Maurice Melchor RDCS 

Reading Physician:       Sravanthi Gill MD 

Requesting Provider: 



Conclusions:           Normal size left ventricle.  

Mild to moderate LVH.  

Normal global systolic LV function.  

EF is 69 %.  

No regional wall motion abnormality.  

Normal size right ventricle.  

Normal RV function.  

The left atrium is severely dilated.  

Mild mitral valve regurgitation is present.  

Mild calcific aortic valve stenosis.  

Mild tricuspid regurgitation is present.  

Right ventricular systolic pressure measures 35mmHg. 



Measurements: 

Chambers                    Valvular Assessment AV/MV

Valvular Assessment TV/PV



Normal                                   Normal

Normal

Name         Value     Range              Name         Value Range

Name           Value Range

IVSd (2D):   1.3 cm (0.6 cm-1.1               AV Vmax:     2.55 m/s (1

m/s-1.7        TR Vmax:       2.76 mm/s ( - )



cm)                                  m/s)              TR PGmax:

30 mmHg ( - )

LVDd (2D):   5.1 cm    (4.2 cm-5.9            AV maxP mmHg ( -

)          syst. PAP: 35 mmHg ( - )



cm)                AV meanP mmHg ( - )          PV Vmax:

0.83 m/s (0.6 m/s-0.9

LVDs (2D):   3.1 cm    (2.1 cm-4              LVOT Vmax:   0.82 m/s

(0.7 m/s-1.1                         m/s)



cm)                                  m/s)              PV PGmax:

3  mmHg ( - )

LVPWd (2D):  1.5 cm    (0.6 cm-1              HANNAH (Vmax):  1.1 cm2 ( -

)



cm)                HANNAH (VTI):   1.2 cm ( - )  

LVOTd        2.1 cm    2.1 cm mm              MV E Vmax:   0.93 m/s (

- )

LVEF (2D):   69        (>=54 %)   



Continued Measurements: 

Chambers                    Valvular Assessment AV/MV

Valvular Assessment TV/PV



Patient: LETICIA MONTANEZ                     MRN: [id number]

Study Date: 2018   Page 1 of 2

01:44 PM 









Name                       Value  Name                       Value

Name                      Value

LADs Lon.4 cm               MV E' Septal:

0.08  m/s    CVP (est.):             5 mmHg

LA Area:                 28.3 cm2         MV E/E' Septal:

11.70

LA Volume:               100 ml           MV E/E' Lateral:

10.10

LA Volume Index:         50.3 ml/m2   



Findings:              Left Ventricle: 

Normal size left ventricle. Mild to moderate LVH. Normal global

systolic LV function. EF is 69 %. No

regional wall motion abnormality. Unable to assess diastolic

dysfunction.

Right Ventricle: 

Normal size right ventricle. Normal RV function.  

Left Atrium: 

The left atrium is severely dilated.  

Right Atrium: 

Right atrial enlargement.  

Mitral Valve: 

The mitral valve is normal in appearance and function. Mild mitral

annular calcification. Mild mitral

valve regurgitation is present. No mitral stenosis is present.  

Aortic Valve: 

Aortic valve is not well visualized. CA+. There is no aortic valve

regurgitation. Mild calcific aortic

valve stenosis.  

Tricuspid Valve: 

The tricuspid valve is normal in appearance and function. Mild

tricuspid regurgitation is present.

Right ventricular systolic pressure measures 35mmHg.  

Pulmonic Valve: 

Pulmonary valve not well visualized. There is no pulmonic

regurgitation seen.

Pericardium: 

Trivial pericardial effusion. 







Electronically signed by Sravanthi Gill MD on 2018 at 01:52 PM 

(No Signature Object) 



Patient: LETICIA MONTANEZ                     MRN: [id number]

Study Date: 2018   Page 2 of 2

01:44 PM 







[id number]

## 2018-06-19 NOTE — EDPHY
HPI/HX/ROS/PE/MDM


Narrative: 





CHIEF COMPLAINT:Dizziness, vomiting, hypotension





HPI: 





This patient is an anticoagulated 83 year old male with past medical history 

significant for atrial fibrillation (Pradaxa), CAD s/p stent placement, 

valvular heart disease, and BPH. He presents today with dizziness, hypotension, 

and vomiting. He was admitted 6/12/18 for CHF exacerbation and treated with 

Lasix. He feels he has not had much water in the past few days, and has felt 

constipated. Four days ago, he began feeling dizzy and lightheaded. This has 

steadily worsened. This morning, he had room-spinning dizziness and felt 

disoriented, causing him to fall. Additionally, when he tried to reach his arm 

up, it became uncontrollable and was "going in circles". He had an episode of 

metallic-smelling dark brown emesis this morning as well. He states he has been 

taking iron supplements for anemia. He denies any known blood in his stool. No 

abdominal pain. The patient denies chest pain, shortness of breath, headache, 

or other associated symptoms. 





REVIEW OF SYSTEMS:


Aside from elements discussed in the HPI, a comprehensive 10-point review of 

systems was reviewed and is negative.





PMH: CAD s/p stent placement x5. Atrial fibrillation (Pradaxa). Valvular heart 

disease. BPH. Hypothyroidism. CHF. 





SOCIAL HISTORY: Wife at bedside. Lives in [place]. Retired. 





PHYSICAL EXAM:


General:Patient is alert, in no acute distress.


ENT:Eyes are normal to inspection.  ENT inspection normal.


Neck: Normal inspection.  Full range of motion.


Respiratory:No respiratory distress.  Breath sounds normal bilaterally.


Cardiovascular: Regular rate and rhythm.  Strong peripheral pulses.  Normal cap 

refill.


Abdomen:The abdomen is nontender to palpation. There are no peritoneal signs. 

There are normal bowel sounds.


Back: Normal to inspection.  No tenderness to palpation.


Skin: Normal color.  No rash.  Warm and dry.


Extremities: Normal appearance.  Full range of motion.


Neuro: Oriented x3.  Normal motor function.  Normal sensory function.





ED Course: 





82 y/o male presents following several days of dizziness and weakness and an 

episode of coffee-ground emesis. BP 80/40 at triage. Plan for EKG, CT head, 

labs including CBC, chemistries, coag panel, BNP, troponin. Plan to administer 

bolus of 500mL IV NS. 





EKG was ordered and interpreted by myself.  Please see [employer] system for 

official reading.





10:05 Patient's hematocrit is severely low at 19. Plan to admit for GI bleed. 

Plan for type and screen, transfusion. 





10:09 Reviewed past medical records. Previous hematocrits were around 34 in 

April 2018. 





Spoke with Dr. Luciano, gastroenterologist. He will scope the patient tomorrow. 

Plan to administer 80mg IV Protonix. Plan to administer an additional 1L IVF. 





10:50 Spoke with hospitalist service. Dr. Rosa accepts admission for GI 

Bleed.  





I spent a total of 45 minutes of critical care time in obtaining history, 

performing a physical exam, bedside monitoring of interventions, collecting and 

interpreting tests and discussion with consultants but not including time spent 

performing procedures.





- Data Points


Imaging Results: 


 Imaging Impressions





Head CT  06/19/18 10:05


Impression:


1. No acute intracranial findings. If symptoms persist and clinical suspicion 

warrants, consider MRI.


2. Diffuse cerebral atrophy with periventricular and subcortical low 

attenuation consistent with chronic microvascular ischemic gliosis.


3. Additional findings as above.


 


Findings discussed with Wicho Morfin MD 6/19/2018 at 11:05. 











Imaging: Discussed imaging studies w/ On call Radiologist


Laboratory Results: 


 Laboratory Results





 06/19/18 09:35 





 06/19/18 09:35 





 











  06/19/18 06/19/18 06/19/18





  10:15 09:45 09:35


 


WBC      





    


 


RBC      





    


 


Hgb      





    


 


Hct      





    


 


MCV      





    


 


MCH      





    


 


MCHC      





    


 


RDW      





    


 


Plt Count      





    


 


MPV      





    


 


Neut % (Auto)      





    


 


Lymph % (Auto)      





    


 


Mono % (Auto)      





    


 


Eos % (Auto)      





    


 


Baso % (Auto)      





    


 


Nucleat RBC Rel Count      





    


 


Absolute Neuts (auto)      





    


 


Absolute Lymphs (auto)      





    


 


Absolute Monos (auto)      





    


 


Absolute Eos (auto)      





    


 


Absolute Basos (auto)      





    


 


Absolute Nucleated RBC      





    


 


Immature Gran %      





    


 


Immature Gran #      





    


 


Platelet Estimate      





    


 


Oval Macrocytes      





    


 


Elliptocytes      





    


 


Acanthocytes (Spur)      





    


 


Smear Review By      





    


 


PT      





    


 


INR      





    


 


APTT      





    


 


Sodium      134 mEq/L L mEq/L





     (135-145) 


 


Potassium      3.9 mEq/L mEq/L





     (3.3-5.0) 


 


Chloride      101 mEq/L mEq/L





     () 


 


Carbon Dioxide      21 mEq/l L mEq/l





     (22-31) 


 


Anion Gap      12 mEq/L mEq/L





     (8-16) 


 


BUN      54 mg/dL H mg/dL





     (7-23) 


 


Creatinine      0.9 mg/dL mg/dL





     (0.7-1.3) 


 


Estimated GFR      > 60 





    


 


Glucose      105 mg/dL H mg/dL





     () 


 


Calcium      8.3 mg/dL L mg/dL





     (8.5-10.4) 


 


POC Troponin I    0.00 ng/mL ng/mL  





    (0.00-0.08)  


 


NT-Pro-B Natriuret Pep      1410 pg/mL H pg/mL





     (0-450) 


 


Patient ABO/Rh  O POSITIVE     





    


 


Antibody Screen  NEGATIVE     





    


 


Crossmatch IS Only  See Detail     





    














  06/19/18 06/19/18





  09:35 09:35


 


WBC    7.79 10^3/uL 10^3/uL





    (3.80-9.50) 


 


RBC    1.92 10^6/uL L 10^6/uL





    (4.40-6.38) 


 


Hgb    6.5 g/dL L g/dL





    (13.7-17.5) 


 


Hct    19.1 % L %





    (40.0-51.0) 


 


MCV    99.5 fL fL





    (81.5-99.8) 


 


MCH    33.9 pg pg





    (27.9-34.1) 


 


MCHC    34.0 g/dL g/dL





    (32.4-36.7) 


 


RDW    15.6 % H %





    (11.5-15.2) 


 


Plt Count    179 10^3/uL 10^3/uL





    (150-400) 


 


MPV    11.0 fL fL





    (8.7-11.7) 


 


Neut % (Auto)    66.1 % %





    (39.3-74.2) 


 


Lymph % (Auto)    20.4 % %





    (15.0-45.0) 


 


Mono % (Auto)    9.8 % %





    (4.5-13.0) 


 


Eos % (Auto)    2.8 % %





    (0.6-7.6) 


 


Baso % (Auto)    0.3 % %





    (0.3-1.7) 


 


Nucleat RBC Rel Count    0.0 % %





    (0.0-0.2) 


 


Absolute Neuts (auto)    5.15 10^3/uL 10^3/uL





    (1.70-6.50) 


 


Absolute Lymphs (auto)    1.59 10^3/uL 10^3/uL





    (1.00-3.00) 


 


Absolute Monos (auto)    0.76 10^3/uL 10^3/uL





    (0.30-0.80) 


 


Absolute Eos (auto)    0.22 10^3/uL 10^3/uL





    (0.03-0.40) 


 


Absolute Basos (auto)    0.02 10^3/uL 10^3/uL





    (0.02-0.10) 


 


Absolute Nucleated RBC    0.00 10^3/uL 10^3/uL





    (0-0.01) 


 


Immature Gran %    0.6 % %





    (0.0-1.1) 


 


Immature Gran #    0.05 10^3/uL 10^3/uL





    (0.00-0.10) 


 


Platelet Estimate    ADEQUATE 





    (ADEQ) 


 


Oval Macrocytes    1+  H 





   


 


Elliptocytes    1+  H 





   


 


Acanthocytes (Spur)    1+  H 





   


 


Smear Review By    Pending 





   


 


PT  21.8 SEC H SEC  





   (12.0-15.0)  


 


INR  1.89  H   





   (0.83-1.16)  


 


APTT  62.2 SEC H SEC  





   (23.0-38.0)  


 


Sodium    





   


 


Potassium    





   


 


Chloride    





   


 


Carbon Dioxide    





   


 


Anion Gap    





   


 


BUN    





   


 


Creatinine    





   


 


Estimated GFR    





   


 


Glucose    





   


 


Calcium    





   


 


POC Troponin I    





   


 


NT-Pro-B Natriuret Pep    





   


 


Patient ABO/Rh    





   


 


Antibody Screen    





   


 


Crossmatch IS Only    





   











Medications Given: 


 





Sodium Chloride (Ns)  1,000 mls @ 250 mls/hr IV ONCE ONE


   Stop: 06/19/18 16:57


   Last Admin: 06/19/18 15:00 Dose:  Not Given





Discontinued Medications





Sodium Chloride (Ns)  500 mls @ 0 mls/hr IV EDNOW ONE; Wide Open


   PRN Reason: Protocol


   Stop: 06/19/18 09:39


   Last Admin: 06/19/18 10:06 Dose:  500 mls


Sodium Chloride (Ns)  1,000 mls @ 0 mls/hr IV ONCE ONE; Wide Open


   PRN Reason: Protocol


   Stop: 06/19/18 10:07


   Last Admin: 06/19/18 10:35 Dose:  1,000 mls


Sodium Chloride (Ns)  1,000 mls @ 0 mls/hr IV ONCE ONE


   PRN Reason: Wide Open


   Stop: 06/19/18 14:22


   Last Admin: 06/19/18 15:05 Dose:  1,000 mls


Pantoprazole Sodium (Protonix)  80 mg IVP EDNOW ONE


   Stop: 06/19/18 10:49


   Last Admin: 06/19/18 10:52 Dose:  80 mg





Point of Care Test Results: 


 Chemistry











  06/19/18





  09:45


 


POC Troponin I  0.00 ng/mL ng/mL





   (0.00-0.08) 














General


Time Seen by Provider: 06/19/18 09:36


Initial Vital Signs: 


 Initial Vital Signs











Temperature (C)  36.7 C   06/19/18 09:23


 


Heart Rate  76   06/19/18 09:23


 


Respiratory Rate  18   06/19/18 09:23


 


Blood Pressure  80/40 L  06/19/18 09:23


 


O2 Sat (%)  98   06/19/18 09:23








 











O2 Delivery Mode               Room Air














Allergies/Adverse Reactions: 


 





clopidogrel bisulfate [From Plavix] Allergy (Severe, Verified 06/19/18 09:23)


 MUSCLE WEAKNESS


atorvastatin [Atorvastatin] Allergy (Verified 06/19/18 09:23)


 MUSCLE PAIN








Home Medications: 














 Medication  Instructions  Recorded


 


Dabigatran Etexilate Mesyl 150 mg PO BID 12/28/17





[Pradaxa 150 MG (*)]  


 


Finasteride [Proscar 5 MG (*)] 2.5 mg PO DAILY 12/28/17


 


Losartan Potassium [Cozaar 25 mg 12.5 mg PO HS 12/28/17





(*)]  


 


Tamsulosin HCl [Flomax 0.4 MG (*)] 0.8 mg PO DAILY 12/28/17


 


Aspirin [Aspirin 81mg (*)] 81 mg PO HS 06/13/18


 


Furosemide [Lasix 20 MG (*)] 20 mg PO DAILY #30 tab 06/13/18


 


Levothyroxine [Synthroid 75 mcg 75 mcg PO DAILY06 06/13/18





(*)]  


 


Metoprolol Succinate Xr [Toprol Xl 50 mg PO BID 06/13/18





50 mg (*)]  


 


Potassium Chloride 10 meq PO DAILY #30 tab.er.prt 06/13/18


 


Rosuvastatin Calcium [Crestor 20mg 10 mg PO HS 06/13/18





(*)]  


 


Herbals/Supplements -Info Only 1 ea PO DAILY 06/19/18


 


Multivitamins [Multivitamin (*)] 0.5 each PO BID 06/19/18














Departure





- Departure


Disposition: [facility]


Clinical Impression: 


 GI bleed





Condition: Fair


Report Scribed for: Wicho Morfin


Report Scribed by: Carmencita Silva


Date of Report: 06/19/18


Time of Report: 09:45


Physician Review and Approval Statement: Portions of this note were transcribed 

by an ED scribe.  I personally performed the history, physical exam, and 

medical decision making; and confirm the accuracy of the information in the 

transcribed note.

## 2018-06-20 LAB — PLATELET # BLD: 125 10^3/UL (ref 150–400)

## 2018-06-20 RX ADMIN — PANTOPRAZOLE SODIUM SCH MG: 40 INJECTION, POWDER, FOR SOLUTION INTRAVENOUS at 14:30

## 2018-06-20 RX ADMIN — PANTOPRAZOLE SODIUM SCH MG: 40 INJECTION, POWDER, FOR SOLUTION INTRAVENOUS at 02:23

## 2018-06-20 RX ADMIN — PANTOPRAZOLE SODIUM SCH MG: 40 INJECTION, POWDER, FOR SOLUTION INTRAVENOUS at 09:44

## 2018-06-20 RX ADMIN — POTASSIUM CHLORIDE SCH: 750 TABLET, EXTENDED RELEASE ORAL at 10:17

## 2018-06-20 RX ADMIN — TAMSULOSIN HYDROCHLORIDE SCH MG: 0.4 CAPSULE ORAL at 18:41

## 2018-06-20 RX ADMIN — ROSUVASTATIN CALCIUM SCH MG: 20 TABLET, FILM COATED ORAL at 21:06

## 2018-06-20 RX ADMIN — THERA TABS SCH EACH: TAB at 21:06

## 2018-06-20 RX ADMIN — LEVOTHYROXINE SODIUM SCH: 75 TABLET ORAL at 05:15

## 2018-06-20 RX ADMIN — THERA TABS SCH: TAB at 10:17

## 2018-06-20 RX ADMIN — PANTOPRAZOLE SODIUM SCH MG: 40 INJECTION, POWDER, FOR SOLUTION INTRAVENOUS at 21:06

## 2018-06-20 RX ADMIN — FINASTERIDE SCH MG: 5 TABLET, FILM COATED ORAL at 18:41

## 2018-06-20 NOTE — GCON
[f rep st]



                                                                    CONSULTATION





PULMONARY/CRITICAL CARE CONSULTATION



DATE OF CONSULTATION:  06/19/2018



REFERRING PHYSICIAN:  Elizabeth Rosa MD





REASON FOR REFERRAL:  Evaluation and management of anemia and weakness.



HISTORY:  The patient is an 83-year-old male with a history of coronary artery disease and chronic at
rial fibrillation, on Pradaxa and Plavix, who presented with several days of lightheadedness and dizz
iness with some dark tarry stools as well as some vomiting of dark red blood.  He became increasingly
 dizzy and weak and was feeling confused when he came into the hospital.  He did not take his Pradaxa
 today.  He was found to have a hemoglobin of 6.5, down from 11, two months ago.



PAST MEDICAL HISTORY:  

1.  Atrial fibrillation. 

2.  Coronary artery disease, status post stenting. 

3.  COPD. 

4.  Hypertension. 

5.  Hyperlipidemia.



MEDICATIONS:  At the time of admission include Pradaxa, Proscar, Cozaar, Flomax, aspirin, Synthroid, 
Toprol, Crestor, and multivitamin.



ALLERGIES:  Plavix and atorvastatin.



SOCIAL HISTORY:  The patient continues to smoke.  He drinks 3-4 alcoholic beverages daily.



FAMILY HISTORY:  Unremarkable.



REVIEW OF SYSTEMS:  A 10-point review of systems adds nothing to the history of present illness.



PHYSICAL EXAMINATION:  GENERAL:  The patient is awake and alert.  He is in no acute distress.  VITAL 
SIGNS:  Blood pressure is 89/42 with a heart rate of 7.  His oxygen saturations are 99% on room air. 
 HEENT:  Normocephalic and atraumatic.  No icterus.  NECK:  No JVD.  Trachea is midline.  CHEST:  [place]
ar to auscultation.  CARDIAC:  Irregularly irregular without murmur.  ABDOMEN:  Soft, nontender.  [place]anson
el sounds are present.  EXTREMITIES:  No clubbing, cyanosis, or edema.  NEURO:  The patient is awake 
and alert.  He has no gross motor or sensory deficits.



LABORATORY:  Hemoglobin is 6.5, down from 11.3, two months ago.  White blood count is 7.8, platelet c
ount is 179.  Chemistry group shows a sodium of 134.  BNP is 1410.  Glucose is 105.  INR is 1.9.



ASSESSMENT:  

1.  Acute gastrointestinal bleed.  The patient appears to have an upper gastrointestinal bleed.  He h
as a significant anemia, likely related to this.  He has a history of alcohol use, but no known cirrh
osis.  The possible causes of the GI bleeding include gastric or peptic ulcer, esophageal varices, an
d a Kay-Valle tear.

2.  Anemia.  This is acute and likely due to a GI blood loss.  The patient is symptomatic and has rec
eived blood transfusions.  

3.  Atrial fibrillation.  The patient's rate is fairly well controlled.  Anticoagulation will be held
.  It is possible that this can be resumed once the patient's acute illness is resolved.



RECOMMENDATIONS:  

1.  Hold Pradaxa and aspirin. 

2.  Transfuse packed red blood cells and repeat hemoglobin. 

3.  GI has been consulted and will likely proceed with endoscopy tomorrow.





Job #:  [id number]

## 2018-06-20 NOTE — ASMTCASEMG
Living Arrangements

 

What is your living           Answers:  With Spouse                           

arrangement? Who do you                                                       

live with?                                                                    

Type Of Residence

 

What kind of residence do     Answers:  House                                 

you live in?                                                                  

Discharge Plan Comments

 

Coordination Status           

Comments                      

Notes:

Patient is an 82yo  male with a hx of cad, afib persistant, presenting with GI bleed and 

severe anemia. Patient is a very high risk with extremely low h/h, concern for active bleeding 

which requires ICU care. No therapies have been ordered at this time. D/C plan TBD. CM will follow.

 

Date Signed:  06/20/2018 03:36 PM

Electronically Signed By:Jie Meng LCSW

## 2018-06-20 NOTE — POSTANESTH
Post Anesthetic Evaluation


Cardiovascular Status: Normal, Stable, Similar to Pre-Op Cond, Other, See 

Comment (Patient had one vagal episode, HR dropped to 30b/min then one full 

monitor screen of asystole with preserved spontaneous ventilation. HR  came 

back without medical intervention. No other bradycardia episodes)


Respiratory Status: Normal, Stable, Similar to Pre-op Cond.


Level of Consciousness/Mental Status: Moderately Sleepy


Pain Control: Adequate, Prn Tx Ordered


Nausea/Vomiting Control: Adequate, Prn Tx Ordered


Complications Possibly Related to Anesthesia: None Noted

## 2018-06-20 NOTE — GPN
[f 
rep st]



                                                                 PROCEDURE NOTE





DATE OF PROCEDURE:  06/20/2018



PROCEDURE:  Enteroscopy with clipping/gold probe.



INDICATIONS:  Mr. Mccarthy  is an 83-year-old male who presents for evaluation 
of black stools as well as an episode of hematemesis/post hemorrhagic anemia. 
He presents for further evaluation.



CONSENT:  Risks, benefits, and alternatives of the procedure were discussed in 
great detail with the patient.  Risks of infection, bleeding, perforation, and  
sedation were discussed.  All questions answered and informed consent was 
obtained.



MEDICATIONS:  Propofol.  Please see anesthesia record for details.



ESTIMATED BLOOD LOSS:  Insignificant.



EGD/ENTEROSCOPY EXAMINATION:  The Olympus upper endoscope was introduced into 
the mouth and advanced to the esophagus.  The proximal, mid, and distal 
esophagus was normal in appearance. 



The stomach was entered and closely examined, including retroflexed view of the 
angularis, cardia and fundus.  A hiatal hernia was noted.  There was a small 
amount of gross red blood noted throughout the stomach.  This was aggressively 
flushed and suctioned.  In the antrum of the stomach a 5 mm clean-based ulcer 
was noted.  Due to fresh blood, but no stigmata of recent bleed, I doubt that 
this is the cause of bleed. 



Eventually, a small oozing spot of blood was noted on the posterior wall in the 
mid body of the stomach approximately 5 cm from the gastroesophageal junction.  
The area was  flushed and suctioned.  No obvious ulcer noted, but it did appear 
to be oozing.  Gold Probe was applied and 1 clip was placed.  Complete 
cessation of bleeding noted. 



The duodenal bulb and 2nd portion of the duodenum were normal in appearance.  
In the visualized 3rd portion of the stomach,  possible gross blood was noted 
and the scope was withdrawn and a pediatric colonoscope was then utilized to 
examine the distal duodenum.  The pediatric colonoscope was passed into the 
proximal jejunum (at leasyt 100cms from the pylorus)  There was black bloody 
material noted on the duodenal wall  which was aggressively suctioned and 
flushed with no source seen.  Suspect this is old blood.



IMPRESSION:  

1.  Enteroscopy/EGD  performed with a area oozing on the posterior wall of  the 
body of the stomach. Clip placed with Gold probe with cessation of bleeding.  
Also has clean-based ulcer in the antrum of the stomach with no stigmata of 
bleed.  I do not suspect this is the cause of bleeding.

2. In the small bowel,  blood was seen. This area was aggressively suctioned 
and flushed with no obvious site of bleed.  

4.  Suspect site of bleeding is on the posterior wall of the stomach?



RECOMMENDATIONS:  

1.  PPI infusion.

2.  No aspirin products.

3.  Monitor H and H.





Job #:  [id number]

MTDD

## 2018-06-20 NOTE — PDANEPAE
ANE Past Medical History





- Pulmonary History


Hx Oxygen in Use at Home: No


Hx Sleep Apnea: No





- Endocrine History


Hx Diabetes: No





- Chronic Pain History


Chronic Pain: No





ANE Review of Systems


Review of Systems: 








ANE Patient History





- Allergies


Allergies/Adverse Reactions: 








clopidogrel bisulfate [From Plavix] Allergy (Severe, Verified 06/19/18 09:23)


 MUSCLE WEAKNESS


atorvastatin [Atorvastatin] Allergy (Verified 06/19/18 09:23)


 MUSCLE PAIN








- Home Medications


Home Medications: 








Dabigatran Etexilate Mesyl [Pradaxa 150 MG (*)] 150 mg PO BID 12/28/17 [Last 

Taken 06/18/18 21:00]


Finasteride [Proscar 5 MG (*)] 2.5 mg PO DAILY 12/28/17 [Last Taken 06/19/18]


Losartan Potassium [Cozaar 25 mg (*)] 12.5 mg PO HS 12/28/17 [Last Taken 06/18/ 18]


Tamsulosin HCl [Flomax 0.4 MG (*)] 0.8 mg PO DAILY 12/28/17 [Last Taken 06/18/18

]


Aspirin [Aspirin 81mg (*)] 81 mg PO HS 06/13/18 [Last Taken 06/18/18]


Levothyroxine [Synthroid 75 mcg (*)] 75 mcg PO DAILY06 06/13/18 [Last Taken 06/ 19/18]


Metoprolol Succinate Xr [Toprol Xl 50 mg (*)] 50 mg PO BID 06/13/18 [Last Taken 

06/19/18]


Rosuvastatin Calcium [Crestor 20mg (*)] 10 mg PO HS 06/13/18 [Last Taken 06/18/ 18]


Herbals/Supplements -Info Only 1 ea PO DAILY 06/19/18 [Last Taken Unknown]


Multivitamins [Multivitamin (*)] 0.5 each PO BID 06/19/18 [Last Taken 06/18/18 

21:00]








- Smoking Hx


Smoking Status: Current every day smoker





- Alcohol Use


Alcohol Use: Heavy (drinks 3-4 drinks/day, 6-8 oz heavy etoh)





ANE Labs/Vital Signs





- Labs


Result Diagrams: 


 06/20/18 05:43





 06/20/18 05:43





- Vital Signs


Blood Pressure: 100/51


Heart Rate: 65


Respiratory Rate: 18


O2 Sat (%): 96


Height: 180.34 cm


Weight: 77.111 kg





ANE Physical Exam





- Airway


Neck exam: FROM


Mallampati Score: Class 2


Mouth exam: poor dentition





- Pulmonary


Pulmonary: no respiratory distress, no rales or rhonchi, reduced air movement





- Cardiovascular


Cardiovascular: irregularly irregular





- ASA Status


ASA Status: IV





ANE Anesthesia Plan


Anesthesia Plan: MAC

## 2018-06-20 NOTE — HOSPPROG
Hospitalist Progress Note


Assessment/Plan: 





82 yo M with hx of CAD, a fib on chronic AC presenting with GI bleed and severe 

anemia





# severe acute blood loss anemia: transfused 5 units in setting of GI bleed as 

next, taken for EGD today with source of bleeding found in stomach and clipped


# GI bleed: with source of bleeding found on egd and clipped, presumably 

gastric ulcer, continue PPI, continue to monitor h/h for signs of recurrent 

bleeding


# a fib, persistent: noted on ecg, rate controlled, holding pradaxa given acute 

bleed, will resume metoprolol likely in am if bp improved


# hypotension: thus far has been relatively fluid responsive, continue to hold 

BP meds including losartan, lasix and metoprolol--consider starting low dose 

metop if hr increases as above 


# CAD: trop of 0, no chest pain, will get repeat trop given bleed, recent echo 

showing EF of 69%, no significant vhd


# bph: continue finasteride


# hypothyroid: continue lt4


# IP status, patient high risk with extremely low h/h, concern for active 

bleeding, requires ICU care


Care plan reviewed with Dr. Luciano, further hx obtained from family present at 

bedside


Subjective: patient taken to egd today, did have vagal episode in endo lab, 

feeling a bit better today, no vomiting


Objective: 


 Vital Signs











Temp Pulse Resp BP Pulse Ox


 


 37.0 C   72   24 H  93/66 L  98 


 


 06/20/18 16:00  06/20/18 16:00  06/20/18 16:00  06/20/18 16:00  06/20/18 16:00








 Laboratory Results





 06/20/18 13:30 





 06/20/18 05:43 





 











 06/19/18 06/20/18 06/21/18





 05:59 05:59 05:59


 


Intake Total  6200 1000


 


Output Total   425


 


Balance  6200 575








 











PT  21.8 SEC (12.0-15.0)  H  06/19/18  09:35    


 


INR  1.89  (0.83-1.16)  H  06/19/18  09:35    








Constitutional: no apparent distress, appears nourished


Eyes: PERRL, pale conjunctiva


Ears, Nose, Mouth, Throat: moist mucous membranes, hearing normal


Cardiovascular: regular rate and rhythym, no murmur, rub, or gallop, No edema


Respiratory: no respiratory distress, no rales or rhonchi, clear to auscultation


Gastrointestinal: normoactive bowel sounds, soft, non-tender abdomen


Skin: warm, No normal color


Musculoskeletal: no muscle tenderness


Neurologic: AAOx3


Psychiatric: interacting appropriately, not anxious, not encephalopathic








ICD10 Worksheet


Patient Problems: 


 Problems











Problem Status Onset


 


GI bleed Acute  


 


Chest pain Acute

## 2018-06-20 NOTE — PDINTPN
Intensivist Progress Note


Assessment/Plan: 


Assessment:


GI Bleed: Due to gastric ulcer. Treated endoscopically with resolution of 

bleeding. Hgb has been stable, as have vitals. 


Anemia: Due to acute blood loss. Hgb stable. 


AF: Chronic. Was on anticoagulation, now held. 








Plan: Continue PPI. Follow H/H. Increase activity, advance diet. Can Tx to SDU.


Hold anticoagulation for now. 





06/20/18 14:17





06/20/18 14:18





Subjective: 





Feels better, increased strength, energy. No N/V, no BMs. Appetite improving. 


Objective: 





 Vital Signs











Temp Pulse Resp BP Pulse Ox


 


 37 C   65   16   93/47 L  100 


 


 06/20/18 12:00  06/20/18 12:00  06/20/18 12:00  06/20/18 12:00  06/20/18 12:00








 Laboratory Results





 06/20/18 13:30 





 06/20/18 05:43 





 











 06/19/18 06/20/18 06/21/18





 05:59 05:59 05:59


 


Intake Total  6200 1000


 


Output Total   425


 


Balance  6200 575








 











PT  21.8 SEC (12.0-15.0)  H  06/19/18  09:35    


 


INR  1.89  (0.83-1.16)  H  06/19/18  09:35    














Physical Exam





- Physical Exam


General Appearance: alert, no apparent distress


Neck: normal inspection


Respiratory: lungs clear, normal breath sounds


Cardiac/Chest: regular rate, rhythm, edema


Abdomen: normal bowel sounds, non-tender


Skin: normal color, warm/dry


Extremities: normal inspection


Neuro/Psych: alert, normal mood/affect, oriented x 3





ICD10 Worksheet


Patient Problems: 


 Problems











Problem Status Onset


 


GI bleed Acute  


 


Chest pain Acute

## 2018-06-20 NOTE — GCON
[f 
rep st]



                                                                    CONSULTATION





DATE OF CONSULTATION:  06/20/2018



CONSULTING PHYSICIAN: Elizabeth Rosa MD



REASON FOR CONSULTATION:  Black tarry stools.



CHIEF COMPLAINT:  Black tarry stools.



HISTORY OF PRESENT ILLNESS:  Mr. Mccarthy is an 83-year-old male with a history 
of COPD, atrial fibrillation on anticoagulation, coronary artery disease with 
stents, who presents to [facility] with complaints of 
lightheadedness and dizziness.  Levi  was doing well until the morning of 
admission when suddenly threw up what he thought was bright red blood.  He 
states that several days prior his stools were noted to be dark in color, which 
he thought was secondary to recent intake of iron pills.  He got progressively 
dizzy and weak and thus came for further evaluation.  He denies any 
exacerbating or alleviating factors to his symptoms.  He denies any chest pain, 
shortness of breath, or bright red blood per rectum.  His last colonoscopy was 
many years ago. 



On evaluation in the ER, he was noted to have a hemoglobin of 6.5 and 
hematocrit of 19.1.  



I am being asked by Dr. Rosa to evaluate this patient in consultation 
regarding his melenic stools.



PAST MEDICAL HISTORY:  

1.  COPD.

2.  Hypertension.

3.  Hyperlipidemia.

4.  Coronary artery disease.

5.  Atrial fibrillation.

6.  Hypothyroidism.

7.  BPH.



PAST SURGICAL HISTORY:  

1.  Tonsillectomy.

2.  Coronary artery stents.



ALLERGIES:  Plavix, atorvastatin.



MEDICATIONS:  Pradaxa, finasteride, losartan, tamsulosin, aspirin, levothyroxine
, metoprolol, Crestor, multivitamin.



FAMILY HISTORY:  Coronary artery disease.



SOCIAL HISTORY:  Positive alcohol and tobacco use.



REVIEW OF SYSTEMS:  A 14-point comprehensive review of systems was asked.  
Pertinent positives and negatives per HPI.



PHYSICAL EXAM:  VITALS:  Blood pressure 100/51, temperature 36.6, respirations 
18, heart rate 65.  GENERAL:  Awake, alert, and oriented x3.  No distress.  
HEENT:  Anicteric.  Moist mucosa.  NECK:  No JVD.  CARDIOVASCULAR:  Regular 
rate and rhythm.  Positive S1, S2.  No gallops appreciated.  LUNGS:  Clear to 
auscultation bilaterally.  No rales, wheezes or rhonchi.  ABDOMEN:  Soft, 
nontender, nondistended.  Positive bowel sounds.  EXTREMITIES: No clubbing, 
cyanosis or edema.  NEUROLOGIC:  Two through 12 grossly intact.  PSYCH:  Normal 
affect.  MUSCULOSKELETAL:  No obvious joint effusions or tenderness.  SKIN:  
Nonicteric, warm.  LYMPH:  No lymphadenopathy.



BLOOD WORK:  Hemoglobin 7.0, hematocrit 20.3, platelets 125.  INR 1.89.  Sodium 
137, potassium 3.8, chloride 113, bicarb 19, BUN 31, down from 54.



ASSESSMENT AND PLAN:  

1.  Melenic stools- with post hemorrhagic anemia. Taking aspirin and Pradaxa.  
Etiology?  Peptic ulcer disease versus other?  At this time, recommend to 
proceed with upper endoscopy to delineate the cause of his symptoms.  The risks 
of infection, bleeding, perforation, and sedation were discussed.  Due to his 
obstructive pulmonary disease as well as coronary artery disease, he is at 
increased risk of sedation.  Will consult Anesthesiology for support.

2.  History of coronary artery disease.

3.  History of chronic obstructive pulmonary disease.

4.  Hyperlipidemia.

5.  Hypertension.

6.  Hypothyroidism.



Thank you for the consultation!





Job #:  [id number]

MTDD

## 2018-06-20 NOTE — PDMN
Medical Necessity


Medical necessity: Pt meets IP criteria per MD; est los >2 mn for eval/tx of 

suspected upper GI bleed w/severe acute blood loss anemia, hypotension, vomiting

/melena, dizziness & weakness w/confusion & recent fall ; pt very high risk w/

extremely low H/H, concern for active bleeding; admit to ICU for close 

monitoring, serial H/H, PRBCs, IV Protonix/antiemetics, GI consult & EGD; hx 

recent hospitalization for tx of heart failure, COPD, AFIB on AC, CAD w/stents, 

HTN & ETOH abuse; per H&P & order 6/19/18

## 2018-06-21 LAB — PLATELET # BLD: 131 10^3/UL (ref 150–400)

## 2018-06-21 RX ADMIN — PANTOPRAZOLE SODIUM SCH MG: 40 INJECTION, POWDER, FOR SOLUTION INTRAVENOUS at 03:36

## 2018-06-21 RX ADMIN — METOPROLOL TARTRATE SCH MG: 25 TABLET, FILM COATED ORAL at 21:11

## 2018-06-21 RX ADMIN — LEVOTHYROXINE SODIUM SCH MCG: 75 TABLET ORAL at 06:01

## 2018-06-21 RX ADMIN — PANTOPRAZOLE SODIUM SCH MG: 40 INJECTION, POWDER, FOR SOLUTION INTRAVENOUS at 08:20

## 2018-06-21 RX ADMIN — ROSUVASTATIN CALCIUM SCH MG: 20 TABLET, FILM COATED ORAL at 21:09

## 2018-06-21 RX ADMIN — POTASSIUM CHLORIDE SCH MEQ: 750 TABLET, EXTENDED RELEASE ORAL at 08:20

## 2018-06-21 RX ADMIN — FINASTERIDE SCH MG: 5 TABLET, FILM COATED ORAL at 08:20

## 2018-06-21 RX ADMIN — THERA TABS SCH EACH: TAB at 08:21

## 2018-06-21 RX ADMIN — PANTOPRAZOLE SODIUM SCH MG: 40 INJECTION, POWDER, FOR SOLUTION INTRAVENOUS at 14:31

## 2018-06-21 RX ADMIN — PANTOPRAZOLE SODIUM SCH MG: 40 INJECTION, POWDER, FOR SOLUTION INTRAVENOUS at 21:09

## 2018-06-21 RX ADMIN — TAMSULOSIN HYDROCHLORIDE SCH MG: 0.4 CAPSULE ORAL at 08:21

## 2018-06-21 RX ADMIN — THERA TABS SCH EACH: TAB at 21:10

## 2018-06-21 NOTE — SOAPPROG
SOAP Progress Note


Assessment/Plan: 


Assessment:


























Plan:





06/21/18 05:36


A/P


1. GI bleed- s/p EGD with clip/gold probe with cessation of bleeding site in 

gastric body. Getting transfusion but no significant rise in HCT.  No clinical 

evidence of GI bleed (i.e. no BMs).  Will monitor and consider CT scan to rule 

out retroperitoneal bleed etc if HCT doesnt rise? Avoid NSAIDS. Continue PPI








Subjective: 





cc: Follow up GI bleed





No complaints.


Objective: 





 Vital Signs











Temp Pulse Resp BP Pulse Ox


 


 36.5 C   82   20   109/56 L  97 


 


 06/21/18 19:56  06/21/18 21:11  06/21/18 19:56  06/21/18 21:11  06/21/18 19:56








 Laboratory Results





 06/21/18 17:00 





 06/20/18 05:43 





 











 06/20/18 06/21/18 06/22/18





 05:59 05:59 05:59


 


Intake Total 6200 2250 2175


 


Output Total  425 


 


Balance 6200 1825 2175








 











PT  21.8 SEC (12.0-15.0)  H  06/19/18  09:35    


 


INR  1.89  (0.83-1.16)  H  06/19/18  09:35    














Physical Exam





- Physical Exam


General Appearance: alert, no apparent distress


EENT: scleral icterus (L), No scleral icterus (R)


Respiratory: lungs clear, normal breath sounds


Cardiac/Chest: regular rate, rhythm, No bradycardia, No tachycardia


Abdomen: non-tender, soft, distended, No guarding, No rebound


Neuro/Psych: normal mood/affect, oriented x 3, No abnormal CNs II-XII





ICD10 Worksheet


Patient Problems: 


 Problems











Problem Status Onset


 


GI bleed Acute  


 


Chest pain Acute

## 2018-06-21 NOTE — HOSPPROG
Hospitalist Progress Note


Assessment/Plan: 





82 yo M with hx of CAD, a fib on chronic AC presenting with GI bleed and severe 

anemia





# severe acute blood loss anemia: transfused 5 units in setting of GI bleed as 

next, 2/2 next, no evidence of ongoing bleeding however does remain quite 

anemic without any real increase in his h/h despite tx of 5 units. Will 

continue to trend h/h, will give one more unit as he remains symptomatic from 

his anemia


# GI bleed: with source of bleeding found on egd and clipped, presumably 

gastric ulcer, continue PPI, as above


# a fib, persistent: noted on ecg, rate controlled, holding pradaxa given acute 

bleed, will resume metoprolol at lower dose given continued mild hypotension


# hypotension: 2/2 acute blood loss and now resolved, continue to hold bp meds


# CAD: trop of 0, no chest pain, will get repeat trop given bleed, recent echo 

showing EF of 69%, no significant vhd


# bph: continue finasteride


# hypothyroid: continue lt4


# IP status, will transition to med surg floor. Care plan reviewed with Dr. Bay and further hx obtained from patients family present at bedside





Subjective: no significant overnight events, patient notes he is feeling better 

today but that when he gets up he is still quite dizzy and feels very weak


Objective: 


 Vital Signs











Temp Pulse Resp BP Pulse Ox


 


 36.3 C   91   14   128/59 H  94 


 


 06/21/18 08:00  06/21/18 10:00  06/21/18 10:00  06/21/18 10:00  06/21/18 10:00








 Laboratory Results





 06/21/18 10:55 





 06/20/18 05:43 





 











 06/20/18 06/21/18 06/22/18





 05:59 05:59 05:59


 


Intake Total 6200 2250 


 


Output Total  425 


 


Balance 6200 1825 








 











PT  21.8 SEC (12.0-15.0)  H  06/19/18  09:35    


 


INR  1.89  (0.83-1.16)  H  06/19/18  09:35    








Constitutional: no apparent distress, appears nourished


Eyes: PERRL, pale conjunctiva


Ears, Nose, Mouth, Throat: moist mucous membranes, hearing normal


Cardiovascular: regular rate and rhythym, no murmur, rub, or gallop, No edema


Respiratory: no respiratory distress, no rales or rhonchi, clear to auscultation


Gastrointestinal: normoactive bowel sounds, soft, non-tender abdomen


Skin: warm, No normal color


Musculoskeletal: no muscle tenderness


Neurologic: AAOx3


Psychiatric: interacting appropriately, not anxious, not encephalopathic








ICD10 Worksheet


Patient Problems: 


 Problems











Problem Status Onset


 


GI bleed Acute  


 


Chest pain Acute

## 2018-06-22 RX ADMIN — METOPROLOL TARTRATE SCH MG: 25 TABLET, FILM COATED ORAL at 09:16

## 2018-06-22 RX ADMIN — METOPROLOL TARTRATE SCH MG: 25 TABLET, FILM COATED ORAL at 20:38

## 2018-06-22 RX ADMIN — FINASTERIDE SCH MG: 5 TABLET, FILM COATED ORAL at 09:12

## 2018-06-22 RX ADMIN — LEVOTHYROXINE SODIUM SCH MCG: 75 TABLET ORAL at 06:14

## 2018-06-22 RX ADMIN — ROSUVASTATIN CALCIUM SCH MG: 20 TABLET, FILM COATED ORAL at 20:38

## 2018-06-22 RX ADMIN — PANTOPRAZOLE SODIUM SCH MG: 40 INJECTION, POWDER, FOR SOLUTION INTRAVENOUS at 02:07

## 2018-06-22 RX ADMIN — PANTOPRAZOLE SODIUM SCH MG: 40 INJECTION, POWDER, FOR SOLUTION INTRAVENOUS at 09:12

## 2018-06-22 RX ADMIN — THERA TABS SCH EACH: TAB at 20:37

## 2018-06-22 RX ADMIN — TAMSULOSIN HYDROCHLORIDE SCH: 0.4 CAPSULE ORAL at 10:10

## 2018-06-22 RX ADMIN — PANTOPRAZOLE SODIUM SCH: 40 INJECTION, POWDER, FOR SOLUTION INTRAVENOUS at 17:58

## 2018-06-22 RX ADMIN — METOPROLOL TARTRATE SCH: 25 TABLET, FILM COATED ORAL at 10:08

## 2018-06-22 RX ADMIN — THERA TABS SCH EACH: TAB at 09:11

## 2018-06-22 RX ADMIN — PANTOPRAZOLE SODIUM SCH MG: 40 TABLET, DELAYED RELEASE ORAL at 20:38

## 2018-06-22 RX ADMIN — POTASSIUM CHLORIDE SCH MEQ: 750 TABLET, EXTENDED RELEASE ORAL at 09:12

## 2018-06-22 NOTE — SOAPPROG
SOAP Progress Note


Assessment/Plan: 


Assessment:


























Plan:





06/21/18 05:36


A/P


1. GI bleed- s/p EGD with clip/gold probe with cessation of bleeding site in 

gastric body. Getting transfusion but no significant rise in HCT.  No clinical 

evidence of GI bleed (i.e. no BMs).  Will monitor and consider CT scan to rule 

out retroperitoneal bleed etc if HCT doesnt rise? Avoid NSAIDS. Continue PPI








06/22/18 11:41


A/P


1. GI bleed- s/p EGD with oozing site in posterior wall of stomach. S/p clip/

gold probe with cessation of bleeding. NO signs of active GI bleed. Hemoglobin 

increasing. Ok to advance diet. DC home tomorrow if no signs of GI bleeding. 

Dr. Jenkins is taking over the service this weekend. Please call him if needed. 

GI will sign off. Thank you for the consultation!


Subjective: 





cc:Follow up GI bleed





No complaints. No recent BM


Objective: 





 Vital Signs











Temp Pulse Resp BP Pulse Ox


 


 36.4 C   80   18   119/87 H  95 


 


 06/22/18 19:32  06/22/18 19:32  06/22/18 19:32  06/22/18 19:32  06/22/18 19:32








 Laboratory Results





 06/22/18 16:25 





 06/20/18 05:43 





 











 06/21/18 06/22/18 06/23/18





 05:59 05:59 05:59


 


Intake Total 2250 2475 450


 


Output Total 425  


 


Balance 1825 2475 450








 











PT  21.8 SEC (12.0-15.0)  H  06/19/18  09:35    


 


INR  1.89  (0.83-1.16)  H  06/19/18  09:35    














Physical Exam





- Physical Exam


General Appearance: alert, no apparent distress


Respiratory: lungs clear, normal breath sounds, wheezing, No rales, No rhonchi


Cardiac/Chest: regular rate, rhythm


Abdomen: normal bowel sounds, non-tender, soft, No guarding, No rebound, No 

ascites


Skin: normal color, warm/dry


Neuro/Psych: alert, normal mood/affect, oriented x 3





ICD10 Worksheet


Patient Problems: 


 Problems











Problem Status Onset


 


Chest pain Acute  


 


GI bleed Acute

## 2018-06-22 NOTE — HOSPPROG
Hospitalist Progress Note


Assessment/Plan: 





#Severe acute blood loss anemia


-due to gastric ulcer (clipped, gold probe) s/p 6 units RBCs. H/H now stable 

today


-No NSAIDs





#Permanent atrial fibrillation: hold Pradaxa, reduce BB to 12.5mg BID with soft 

BP





#BPH: hold Flomax with hypotension





#CAD: no chest pain. Statin, BB





#Diet: regular





#Disp: monitor H/H tonight, if stable, can discharge tomorrow


Subjective: no BM in 3 days


Objective: 


 Vital Signs











Temp Pulse Resp BP Pulse Ox


 


 36.8 C   69   16   110/56 L  92 


 


 06/22/18 05:02  06/22/18 05:02  06/22/18 05:02  06/22/18 05:02  06/22/18 05:02








 Laboratory Results





 06/22/18 05:10 





 06/20/18 05:43 





 











 06/21/18 06/22/18 06/23/18





 05:59 05:59 05:59


 


Intake Total 2250 2475 


 


Output Total 425  


 


Balance 1825 2475 








 











PT  21.8 SEC (12.0-15.0)  H  06/19/18  09:35    


 


INR  1.89  (0.83-1.16)  H  06/19/18  09:35    














- Time Spent With Patient


Time Spent with Patient: greater than 35 minutes


Time Spent with Patient: Greater than 35 minutes spent on this patients care, 

greater than 50% of time spent counseling, educating, and coordinating care 

regarding the above mentioned plan.





- Physical Exam


Constitutional: no apparent distress


Eyes: PERRL


Ears, Nose, Mouth, Throat: moist mucous membranes


Cardiovascular: irregularly irregular


Respiratory: no respiratory distress


Gastrointestinal: normoactive bowel sounds, No no palpable masses, No tenderness


Genitourinary: no bladder fullness


Skin: warm


Musculoskeletal: full muscle strength


Neurologic: AAOx3, CN II-XII Intact


Psychiatric: interacting appropriately





ICD10 Worksheet


Patient Problems: 


 Problems











Problem Status Onset


 


GI bleed Acute  


 


Chest pain Acute

## 2018-06-23 VITALS — SYSTOLIC BLOOD PRESSURE: 142 MMHG | DIASTOLIC BLOOD PRESSURE: 78 MMHG

## 2018-06-23 RX ADMIN — LEVOTHYROXINE SODIUM SCH MCG: 75 TABLET ORAL at 05:48

## 2018-06-23 RX ADMIN — PANTOPRAZOLE SODIUM SCH MG: 40 TABLET, DELAYED RELEASE ORAL at 10:21

## 2018-06-23 RX ADMIN — TAMSULOSIN HYDROCHLORIDE SCH MG: 0.4 CAPSULE ORAL at 10:22

## 2018-06-23 RX ADMIN — FINASTERIDE SCH MG: 5 TABLET, FILM COATED ORAL at 10:21

## 2018-06-23 RX ADMIN — POTASSIUM CHLORIDE SCH MEQ: 750 TABLET, EXTENDED RELEASE ORAL at 10:24

## 2018-06-23 RX ADMIN — THERA TABS SCH EACH: TAB at 10:23

## 2018-06-23 RX ADMIN — METOPROLOL TARTRATE SCH MG: 25 TABLET, FILM COATED ORAL at 10:23

## 2018-06-23 NOTE — ASMTLACE
LACE

 

Length of stay for            Answers:  3 days                                

current admission                                                             

Acuity / Level of             Answers:  Yes                                   

Care: Did the patient                                                         

have an inpatient                                                             

admission?                                                                    

Comorbidities - select        Answers:  Congestive heart failure              

all that apply                                                                

                                        Coronary Artery Disease               

# of Emergency department     Answers:  1-2                                   

visits in the last 6                                                          

months                                                                        

Score: 11

 

Date Signed:  06/23/2018 12:14 PM

Electronically Signed By:Ernestina Mclaughlin RN

## 2018-06-23 NOTE — ASMTCMCOM
CM Note

 

CM Note                       

Notes:

Spoke w/pt, anticipate he will dc with support of his wife. No needs identified, CM available for 

any changes.



DC Plan: Independent

 

Date Signed:  06/23/2018 12:14 PM

Electronically Signed By:Ernestina Mclaughlin RN

## 2018-06-23 NOTE — GDS
[f 
rep st]



                                                             DISCHARGE SUMMARY





DISCHARGE DIAGNOSES:  

1.  Acute blood loss anemia. 

2.  Gastrointestinal bleed with oozing of the posterior wall of the body of the 
stomach, status post clipping and Gold Probe.

3.  Permanent atrial fibrillation, on anticoagulation.

4.  Coronary artery disease.

5.  Chronic obstructive pulmonary disease.

6.  Hypertension.

7.  Hypotension.

8.  Hyperlipidemia.

9.  Hypothyroidism.

10.  Benign prostatic hypertrophy.



HISTORY OF PRESENT ILLNESS:  An 83-year-old male with past medical history of 
coronary disease, atrial fibrillation, on chronic Pradaxa, presenting with 
several days of lightheadedness, dizziness and melena.  He had several episodes 
of vomiting with dark red emesis.  He thought initially his stools were black 
because he had been taking iron.  He became so weak that he fell and was 
confused.



HOSPITAL COURSE:  

1.  Acute blood loss anemia: EGD showed oozing at the posterior wall of the 
stomach.  This was clipped and cauterized with Gold Probe. Required significant 
amount of blood at 6 units. H/H are 9.8/ 29 at discharge.  Advised to hold his 
anticoagulation and aspirin until Monday, with a repeat CBC.  

2.  Hypotension: was multifactorial with decreased p.o. and blood loss.  

3.  Hypertension. resume home medications.

4.  Benign prostatic hyperplasia, on finasteride and Flomax.

5.  Coronary artery disease.  Resume aspirin on Monday.  Continue beta blocker 
and statin.

6.  Hypothyroidism, Synthroid.



DISPOSITION:  Patient is stable for discharge home.



NEW MEDICATIONS:  Protonix 40 mg b.i.d.



FOLLOWUP:  

1.  Primary care physician on Monday for repeat CBC.  

2.  Dr. Daniel, his cardiologist.



PHYSICAL EXAMINATION:  VITAL SIGNS:  Today, temperature 36.4 blood pressure 142/
78, heart rate in the 60s to 90s, respirations 16, 92% on room air.  GENERAL:  
He is well appearing, sitting in bed, no acute distress.  HEENT:  PERRLA.  
Moist mucous membranes.  CV:  Irregularly irregular.  LUNGS:  Clear.  ABDOMEN:  
Soft, nontender.  Positive bowel sounds.  :  No Nieves. MUSCULOSKELETAL:  5/5 
upper and lower extremity strength. NEURO:  2 through 12 intact.  PSYCH:  Alert 
and oriented x3. 



Time spent on discharge:  Greater 30 minutes coordinating discharge and 
followup and counseling patient on new medications.





Job #:  [id number]

MTDD

## 2018-06-24 ENCOUNTER — HOSPITAL ENCOUNTER (EMERGENCY)
Dept: HOSPITAL 80 - FED | Age: 83
Discharge: HOME | End: 2018-06-24
Payer: COMMERCIAL

## 2018-06-24 VITALS — SYSTOLIC BLOOD PRESSURE: 112 MMHG | DIASTOLIC BLOOD PRESSURE: 54 MMHG

## 2018-06-24 DIAGNOSIS — F17.200: ICD-10-CM

## 2018-06-24 DIAGNOSIS — Z95.5: ICD-10-CM

## 2018-06-24 DIAGNOSIS — E86.9: ICD-10-CM

## 2018-06-24 DIAGNOSIS — I50.9: ICD-10-CM

## 2018-06-24 DIAGNOSIS — I11.0: ICD-10-CM

## 2018-06-24 DIAGNOSIS — R42: Primary | ICD-10-CM

## 2018-06-24 DIAGNOSIS — Z79.82: ICD-10-CM

## 2018-06-24 LAB
INR PPP: 1.18 (ref 0.83–1.16)
PLATELET # BLD: 213 10^3/UL (ref 150–400)
PROTHROMBIN TIME: 15.2 SEC (ref 12–15)

## 2018-06-24 NOTE — CPEKG
Heart Rate: 69

RR Interval: 870

QRSD Interval: 84

QT Interval: 440

QTC Interval: 472

QRS Axis: -25

T Wave Axis: 18

EKG Severity - ABNORMAL ECG -

EKG Impression: ATRIAL FIBRILLATION

EKG Impression: PROBABLE INFERIOR INFARCT, AGE INDETERMINATE

EKG Impression: PROBABLE ANTEROSEPTAL INFARCT, AGE INDETERM

Electronically Signed By: Jannie Gotti 24-Jun-2018 23:04:35

## 2018-06-24 NOTE — EDPHY
H & P


Stated Complaint: RED BLOOD IN STOOL/"LOW BP"/DIZZINESS/D/C YEST INPATIENT


Time Seen by Provider: 06/24/18 21:15


HPI/ROS: 





CHIEF COMPLAINT:  Dizziness, bloody stool





HISTORY OF PRESENT ILLNESS:  83-year-old male with a history of atrial 

fibrillation and recent upper GI hemorrhage presents with dizziness and bloody 

stool.  He was admitted on 6/19/2018 for GI bleed.  Hematocrit was 19 and he 

was transfused 6 units packed red blood cells.  Endoscopy revealed losing blood 

from a stomach ulceration.  The area was clipped and cauterized.  He was 

discharged home yesterday feeling well.  This afternoon after getting up from a 

nap, he had onset of dizziness while climbing a flight of stairs.  He felt like 

he might faint.  Since then, every time he stands up, he is dizzy and needs to 

hold onto objects to keep himself upright.  Took stool softener today, b/c of 

no BM in 5 days.  Stool was black, pt thinks this is because of iron 

supplementation





REVIEW OF SYSTEMS:  complete 10 point ROS negative except at noted in the HPI








- Personal History


Current Tetanus Diphtheria and Acellular Pertussis (TDAP): Yes


Tetanus Vaccine Date: 2003





- Medical/Surgical History


Hx Asthma: No


Hx Chronic Respiratory Disease: No


Hx Diabetes: No


Hx Cardiac Disease: Yes


Hx Renal Disease: No


Hx Cirrhosis: No


Hx Alcoholism: No


Hx HIV/AIDS: No


Hx Splenectomy or Spleen Trauma: No


Other PMH: AFib, Stents x 5, pericarditis, mi, bph, hyperlipidemia, hypertension

, hypothyroid, CHF, ANEMIA HX BLOOD TX





- Social History


Smoking Status: Heavy smoker





- Physical Exam


Exam: 





General Appearance:  Alert, pleasant


Eyes:  Pupils equal and round, no conjunctival pallor or injection


ENT, Mouth:  Mucous membranes moist


Neck:  Normal inspection


Respiratory:  Lungs are clear to auscultation


Cardiovascular:  Regular rate and rhythm


Gastrointestinal:  Abdomen is soft and nontender


Rectal:  Black stool


Neurological:  A&O, nonfocal, normal gait


Skin:  Warm and dry


Extremities:  Nontender, no pedal edema


Psychiatric:  Mood and affect normal





Constitutional: 


 Initial Vital Signs











Temperature (C)  36.4 C   06/24/18 20:50


 


Heart Rate  80   06/24/18 20:50


 


Respiratory Rate  16   06/24/18 20:50


 


Blood Pressure  114/53 L  06/24/18 20:50


 


O2 Sat (%)  97   06/24/18 20:50








 











O2 Delivery Mode               Room Air














Allergies/Adverse Reactions: 


 





clopidogrel bisulfate [From Plavix] Allergy (Severe, Verified 06/24/18 20:49)


 MUSCLE WEAKNESS


atorvastatin [Atorvastatin] Allergy (Verified 06/24/18 20:49)


 MUSCLE PAIN








Home Medications: 














 Medication  Instructions  Recorded


 


Dabigatran Etexilate Mesyl 150 mg PO BID 12/28/17





[Pradaxa 150 MG (*)]  


 


Finasteride [Proscar 5 MG (*)] 2.5 mg PO DAILY 12/28/17


 


Losartan Potassium [Cozaar 25 mg 12.5 mg PO HS 12/28/17





(*)]  


 


Tamsulosin HCl [Flomax 0.4 MG (*)] 0.8 mg PO DAILY 12/28/17


 


Aspirin [Aspirin 81mg (*)] 81 mg PO HS 06/13/18


 


Furosemide [Lasix 20 MG (*)] 20 mg PO DAILY #30 tab 06/13/18


 


Levothyroxine [Synthroid 75 mcg 75 mcg PO DAILY06 06/13/18





(*)]  


 


Metoprolol Succinate Xr [Toprol Xl 50 mg PO BID 06/13/18





50 mg (*)]  


 


Potassium Chloride 10 meq PO DAILY #30 tab.er.prt 06/13/18


 


Rosuvastatin Calcium [Crestor 20mg 10 mg PO HS 06/13/18





(*)]  


 


Herbals/Supplements -Info Only 1 ea PO DAILY 06/19/18


 


Multivitamins [Multivitamin (*)] 0.5 each PO BID 06/19/18


 


Docusate Sodium [Colace 100 MG (*)] 100 mg PO BID  cap 06/23/18


 


Pantoprazole Sodium [Protonix 40mg 40 mg PO BID #60 tab 06/23/18





(*)]  














Medical Decision Making





- Diagnostics


EKG Interpretation: 





EKG interpreted by me reveals atrial fibrillation, ventricular rate 69, poor R-

wave progression, inferior Q-waves.  Interpretation abnormal EKG


ED Course/Re-evaluation: 





This patient presents with orthostatic dizziness after recent GI bleed.  

Initial vital signs are normal.  Stool positive for occult blood, as expected 

for first BM after recent upper GI bleed.  Hematocrit is stable at 29.  

Orthostatics vital signs positive.  IV NS 500ml given.  Feels better, /84

, HR 80's.  Declines admission, wants to go home, dizziness resolved. Ambulates 

with a steady gait.  No evidence of acute hemorrhage.  After further discussion 

with the pt, he has been limiting his oral intake and restarting diuretics 

since d/c home.  He will increase his fluid intake to normal.  Return with 

dizziness, any concerns.


Differential Diagnosis: 





includes though not limited to recurrent GI bleed, severe anemia, ACS, CVA, TIA

, electrolyte abn, ARF





- Data Points


Laboratory Results: 


 Laboratory Results





 06/24/18 21:14 





 06/24/18 21:14 








Medications Given: 


 








Discontinued Medications





Sodium Chloride (Ns)  500 mls @ 1,000 mls/hr IV EDNOW ONE


   PRN Reason: Protocol


   Stop: 06/24/18 22:47


   Last Admin: 06/24/18 22:43 Dose:  500 mls





Point of Care Test Results: 


 Chemistry











  06/24/18





  22:40


 


POC Troponin I  0.00 ng/mL ng/mL





   (0.00-0.08) 














Departure





- Departure


Disposition: Home, Routine, Self-Care


Clinical Impression: 


 Dizziness





Condition: Good


Instructions:  Dizziness (ED)


Additional Instructions: 


Drink plenty of fluids.


Return for recurrent dizziness or any concerns.  


Referrals: 


Wali Dias MD [Primary Care Provider] - 1 day without fail

## 2018-08-21 ENCOUNTER — HOSPITAL ENCOUNTER (OUTPATIENT)
Dept: HOSPITAL 80 - BMCIMAGING | Age: 83
End: 2018-08-21
Attending: INTERNAL MEDICINE
Payer: COMMERCIAL

## 2018-08-21 DIAGNOSIS — K86.9: ICD-10-CM

## 2018-08-21 DIAGNOSIS — K76.9: Primary | ICD-10-CM

## 2018-08-21 DIAGNOSIS — K80.10: ICD-10-CM

## 2018-08-21 DIAGNOSIS — I71.4: ICD-10-CM

## 2018-08-22 ENCOUNTER — HOSPITAL ENCOUNTER (OUTPATIENT)
Dept: HOSPITAL 80 - FIMAGING | Age: 83
End: 2018-08-22
Attending: INTERNAL MEDICINE
Payer: COMMERCIAL

## 2018-08-22 DIAGNOSIS — K80.10: ICD-10-CM

## 2018-08-22 DIAGNOSIS — I71.4: ICD-10-CM

## 2018-08-22 DIAGNOSIS — I51.7: ICD-10-CM

## 2018-08-22 DIAGNOSIS — C78.7: Primary | ICD-10-CM

## 2018-08-22 DIAGNOSIS — I25.10: ICD-10-CM

## 2018-08-22 PROCEDURE — 74177 CT ABD & PELVIS W/CONTRAST: CPT
